# Patient Record
Sex: FEMALE | Race: OTHER | HISPANIC OR LATINO
[De-identification: names, ages, dates, MRNs, and addresses within clinical notes are randomized per-mention and may not be internally consistent; named-entity substitution may affect disease eponyms.]

---

## 2020-03-17 PROBLEM — Z00.00 ENCOUNTER FOR PREVENTIVE HEALTH EXAMINATION: Status: ACTIVE | Noted: 2020-03-17

## 2020-04-02 ENCOUNTER — APPOINTMENT (OUTPATIENT)
Dept: PULMONOLOGY | Facility: CLINIC | Age: 70
End: 2020-04-02

## 2020-07-01 ENCOUNTER — APPOINTMENT (OUTPATIENT)
Dept: PULMONOLOGY | Facility: CLINIC | Age: 70
End: 2020-07-01
Payer: MEDICARE

## 2020-07-01 VITALS
HEIGHT: 67 IN | DIASTOLIC BLOOD PRESSURE: 90 MMHG | SYSTOLIC BLOOD PRESSURE: 120 MMHG | TEMPERATURE: 98 F | WEIGHT: 140 LBS | RESPIRATION RATE: 12 BRPM | HEART RATE: 67 BPM | BODY MASS INDEX: 21.97 KG/M2

## 2020-07-01 DIAGNOSIS — I73.00 RAYNAUD'S SYNDROME W/OUT GANGRENE: ICD-10-CM

## 2020-07-01 PROCEDURE — 99204 OFFICE O/P NEW MOD 45 MIN: CPT

## 2020-07-01 RX ORDER — HYDROXYCHLOROQUINE SULFATE 200 MG/1
200 TABLET, FILM COATED ORAL
Refills: 0 | Status: ACTIVE | COMMUNITY

## 2020-07-01 RX ORDER — AMLODIPINE BESYLATE 5 MG/1
TABLET ORAL
Refills: 0 | Status: ACTIVE | COMMUNITY

## 2020-07-01 NOTE — CONSULT LETTER
[Please see my note below.] : Please see my note below. [Consult Letter:] : I had the pleasure of evaluating your patient, [unfilled]. [Consult Closing:] : Thank you very much for allowing me to participate in the care of this patient.  If you have any questions, please do not hesitate to contact me. [FreeTextEntry2] : TEE Richter [FreeTextEntry3] : Jose Winter MD\par Director, [facility]\par Pulmonary Division\par [facility]\par [address]\par [place] [id number]\par [phone]  [Sincerely,] : Sincerely,

## 2020-07-01 NOTE — ASSESSMENT
[FreeTextEntry1] : ? interstitial lung disease from SLE in patient with known SLE under good control and Raynaud's phenomenon.  If present may be indcation for more aggressive rx of SLE.  Will get pulmonary function testing (required currently to have Covid-19 testing prior to testing), if significantly abnormal  will get CT chest when next seen.

## 2020-07-01 NOTE — PROCEDURE
[FreeTextEntry1] : CXR from 1/29/2020 reviewed: ? mild interstitial changes at bases, normal heart size, no adenopathy

## 2020-07-01 NOTE — PHYSICAL EXAM
[No Acute Distress] : no acute distress [Normal Oropharynx] : normal oropharynx [Normal Appearance] : normal appearance [No Neck Mass] : no neck mass [Normal S1, S2] : normal s1, s2 [Normal Rate/Rhythm] : normal rate/rhythm [No Resp Distress] : no resp distress [No Murmurs] : no murmurs [No Acc Muscle Use] : no acc muscle use [No Abnormalities] : no abnormalities [Normal to Percussion] : normal to percussion [No Clubbing] : no clubbing [Normal Gait] : normal gait [Benign] : benign [No Edema] : no edema [FROM] : FROM [No Cyanosis] : no cyanosis [Oriented x3] : oriented x3 [Normal Color/ Pigmentation] : normal color/ pigmentation [No Focal Deficits] : no focal deficits [TextBox_68] : few fine crackles heard R>L base, no wheeze [Normal Affect] : normal affect [TextBox_105] : unable to get oximetry reading from fingers (nail polish vs. vascular)

## 2020-07-01 NOTE — HISTORY OF PRESENT ILLNESS
[TextBox_4] : Very pleasant 69-year-old female is here for evaluation of shortness of breath on exertion. She reports no problem on level ground but feels out of breath if she has to walk up hills. This been present for 2 or 3 years, not clearly worsening recently. She denies cough, wheezing, sputum production. There is no history of asthma and she has never smoked. She tells me that in January she had an extensive cardiac evaluation and she was told everything was normal. She does not have seasonal allergies or sinus problems.\par \par She has a history of systemic lupus erythematosus diagnosed about 7 years ago. She presented originally with joint pains. Her complaints are minimal now and she has been followed with hydroxychloroquine treatment only. She does have painful extremities within exposed to cold, consistent with Raynaud's phenomenon.\par \par

## 2020-07-01 NOTE — REVIEW OF SYSTEMS
[Recent Wt Loss (___ Lbs)] : ~T no recent weight loss [Arthralgias] : arthralgias [Recent Wt Gain (___ Lbs)] : ~T no recent weight gain [Negative] : Endocrine [TextBox_94] : some chronic arthritic pain  Rknee

## 2020-10-29 ENCOUNTER — APPOINTMENT (OUTPATIENT)
Dept: PULMONOLOGY | Facility: CLINIC | Age: 70
End: 2020-10-29
Payer: MEDICARE

## 2020-10-29 VITALS
HEART RATE: 93 BPM | BODY MASS INDEX: 21.97 KG/M2 | SYSTOLIC BLOOD PRESSURE: 108 MMHG | WEIGHT: 140 LBS | HEIGHT: 67 IN | DIASTOLIC BLOOD PRESSURE: 80 MMHG | TEMPERATURE: 98.3 F | OXYGEN SATURATION: 100 %

## 2020-10-29 DIAGNOSIS — R06.00 DYSPNEA, UNSPECIFIED: ICD-10-CM

## 2020-10-29 PROCEDURE — 99072 ADDL SUPL MATRL&STAF TM PHE: CPT

## 2020-10-29 PROCEDURE — 99214 OFFICE O/P EST MOD 30 MIN: CPT

## 2020-10-29 NOTE — PHYSICAL EXAM
[No Acute Distress] : no acute distress [Normal Appearance] : normal appearance [Normal Rate/Rhythm] : normal rate/rhythm [Normal S1, S2] : normal s1, s2 [No Murmurs] : no murmurs [No Resp Distress] : no resp distress [Clear to Auscultation Bilaterally] : clear to auscultation bilaterally [No Cyanosis] : no cyanosis [No Edema] : no edema [FROM] : FROM [Normal Color/ Pigmentation] : normal color/ pigmentation [Oriented x3] : oriented x3 [Normal Affect] : normal affect

## 2020-11-16 ENCOUNTER — LABORATORY RESULT (OUTPATIENT)
Age: 70
End: 2020-11-16

## 2020-11-18 ENCOUNTER — APPOINTMENT (OUTPATIENT)
Dept: PULMONOLOGY | Facility: CLINIC | Age: 70
End: 2020-11-18
Payer: MEDICARE

## 2020-11-18 PROBLEM — R06.00 DYSPNEA ON EXERTION: Status: ACTIVE | Noted: 2020-07-01

## 2020-11-18 PROCEDURE — 94729 DIFFUSING CAPACITY: CPT

## 2020-11-18 PROCEDURE — 94060 EVALUATION OF WHEEZING: CPT

## 2020-11-18 PROCEDURE — 94727 GAS DIL/WSHOT DETER LNG VOL: CPT

## 2020-11-18 NOTE — HISTORY OF PRESENT ILLNESS
[TextBox_4] : 10/29/2020: Asked to evaluate patient by Dr Sotero Parker for dyspnea; however, I see she was evaluated by Dr Winter for this same problem in July and planned for PFT and, if abnormal, CT chest. She has a background of SLE on HCQ, Dr Moulton. Unclear why the PFT was not done. Reports dyspnea since last year on walking or household activities. Not worsening. Sensation of cough w mucus in throat. No history of lung disease, never smoker. Retired . Has seen Dr Isaac from cardiology, had ECG, echo, stress, reports all normal.\par

## 2020-11-18 NOTE — CONSULT LETTER
[Dear  ___] : Dear  [unfilled], [Courtesy Letter:] : I had the pleasure of seeing your patient, [unfilled], in my office today. [Please see my note below.] : Please see my note below. [Consult Closing:] : Thank you very much for allowing me to participate in the care of this patient.  If you have any questions, please do not hesitate to contact me. [Sincerely,] : Sincerely, [FreeTextEntry2] : Sotero Parker MD\par [address]\par [place] [address]\par  [FreeTextEntry3] : Marycarmen Benitez MD, FCCP\par

## 2020-11-18 NOTE — ASSESSMENT
[FreeTextEntry1] : Data reviewed:\par \par PA/lat CXR LHR 2/2020 personally reviewed : some inc markings at L base\par CT abd LHR 2016 had normal lung parenchyma\par \par Impression:\par PATEL\par SLE\par \par Plan:\par Agree w Dr Winter's plan. Get PFT; if abnormal, HRCT.\par --\par PFT 11/18/20: severe restriction, FVC 1.59L (56%), TLC 2.26L (47%), DLCO 9.32 (45%) / get HRCT

## 2020-12-01 ENCOUNTER — NON-APPOINTMENT (OUTPATIENT)
Age: 70
End: 2020-12-01

## 2020-12-17 ENCOUNTER — APPOINTMENT (OUTPATIENT)
Dept: PULMONOLOGY | Facility: CLINIC | Age: 70
End: 2020-12-17

## 2021-01-15 ENCOUNTER — APPOINTMENT (OUTPATIENT)
Dept: PULMONOLOGY | Facility: CLINIC | Age: 71
End: 2021-01-15
Payer: MEDICARE

## 2021-01-15 VITALS
HEART RATE: 91 BPM | HEIGHT: 67 IN | BODY MASS INDEX: 22.29 KG/M2 | TEMPERATURE: 97.2 F | DIASTOLIC BLOOD PRESSURE: 78 MMHG | WEIGHT: 142 LBS | SYSTOLIC BLOOD PRESSURE: 90 MMHG | OXYGEN SATURATION: 98 %

## 2021-01-15 PROCEDURE — 99072 ADDL SUPL MATRL&STAF TM PHE: CPT

## 2021-01-15 PROCEDURE — 99214 OFFICE O/P EST MOD 30 MIN: CPT

## 2021-01-15 NOTE — CONSULT LETTER
[Dear  ___] : Dear  [unfilled], [Courtesy Letter:] : I had the pleasure of seeing your patient, [unfilled], in my office today. [Please see my note below.] : Please see my note below. [Consult Closing:] : Thank you very much for allowing me to participate in the care of this patient.  If you have any questions, please do not hesitate to contact me. [Sincerely,] : Sincerely, [___] : [unfilled] [FreeTextEntry2] : Misael Moulton MD\par [address] [place] [address]\[place] [address] [FreeTextEntry3] : Marycarmen Benitez MD, FCCP\par

## 2021-01-15 NOTE — PHYSICAL EXAM
[No Acute Distress] : no acute distress [Normal Rate/Rhythm] : normal rate/rhythm [Normal S1, S2] : normal s1, s2 [No Resp Distress] : no resp distress [No Murmurs] : no murmurs [No Cyanosis] : no cyanosis [No Edema] : no edema [FROM] : FROM [TextBox_68] : alfredo gómez

## 2021-01-15 NOTE — ASSESSMENT
[FreeTextEntry1] : Data reviewed:\par \par CT chest LHR 12/8/2020 personally reviewed :\par 1. Bilateral apical pleural parenchymal scar and mild cylindrical traction bronchiectasis\par 2. Predominantly subpleural pattern of interstitial and fibrotic lung disease, mild traction bronchiectasis, predominantly in the mid to lower lung fields. \par 3. Scattered mild pattern of groundglass opacity, multifocal, predominantly mid to lower lung fields, not sparing the immediate subpleural lung fields. \par 4. Limited liver images demonstrate moderate fatty infiltration.\par 5. At least mild sliding hiatal hernia.\par CT abd LHR 2016 had normal lung parenchyma\par \par PFT 11/18/20: severe restriction, FVC 1.59L (56%), TLC 2.26L (47%), DLCO 9.32 (45%)\par \par Impression:\par SLE-ILD\par \par Plan:\par May be NSIP. Would give trial of antiinflammatory, prednisone 60mg x 1 month, w Bactrim ppx.\par Will see her back in one month and hopefully taper.\par If there is evidence of progression over time would be a candidate for Ofev, but I only have limited data from 5 years ago and then the current time point, so will observe.

## 2021-01-15 NOTE — HISTORY OF PRESENT ILLNESS
[TextBox_4] : 10/29/2020: Asked to evaluate patient by Dr Sotero Parker for dyspnea; however, I see she was evaluated by Dr Winter for this same problem in July and planned for PFT and, if abnormal, CT chest. She has a background of SLE on HCQ, Dr Moulton. Unclear why the PFT was not done. Reports dyspnea since last year on walking or household activities. Not worsening. Sensation of cough w mucus in throat. No history of lung disease, never smoker. Retired . Has seen Dr Isaac from cardiology, had ECG, echo, stress, reports all normal.\par \par 1/15/21: Returned for follow up. No change in her dyspnea. Has had phone follow up w Dr Moulton, remains on HCQ.\par

## 2021-02-10 ENCOUNTER — APPOINTMENT (OUTPATIENT)
Dept: ENDOCRINOLOGY | Facility: CLINIC | Age: 71
End: 2021-02-10
Payer: MEDICARE

## 2021-02-10 VITALS
BODY MASS INDEX: 22.29 KG/M2 | HEART RATE: 108 BPM | DIASTOLIC BLOOD PRESSURE: 82 MMHG | HEIGHT: 67 IN | WEIGHT: 142 LBS | SYSTOLIC BLOOD PRESSURE: 137 MMHG

## 2021-02-10 DIAGNOSIS — E55.9 VITAMIN D DEFICIENCY, UNSPECIFIED: ICD-10-CM

## 2021-02-10 PROCEDURE — 99205 OFFICE O/P NEW HI 60 MIN: CPT

## 2021-02-10 PROCEDURE — 99072 ADDL SUPL MATRL&STAF TM PHE: CPT

## 2021-02-12 PROBLEM — E55.9 HYPOVITAMINOSIS D: Status: ACTIVE | Noted: 2021-02-12

## 2021-02-12 NOTE — HISTORY OF PRESENT ILLNESS
[FreeTextEntry1] : 69 y/o F w/ Hx of OP, SLE on HCQ, Raynaud's, prior chronic steroid use and possible ILD.\par here for initial evaluation and management of bone health\par generally feels well and endorses no acute complaints.\par reports being told by Rheumatologist that "her hormones were low". States her initial diagnosis of OP was ~ 4-5 years ago, completed ~ 3 years of Boniva w. fair adherence up until 2018. Is not sure why medication was stopped. Also reports taking ~ 1 year of methylprednisolone for rheumatologic issues, was gradually titrated off in early 2019. she was off steroids up until 1/2021 when she was started on prednisone for potential ILD by Pulm. She is adherent to this and reports she was instructed to stop w titration. States for her OP, she received a single dose of Prolia in 2020, but was told afterwards that since her "hormones were off, she should see and Endocrinologist". she denies any history of personal or maternal fractures, nephrolithiasis. denies frequent falls. She otherwise denies any f/c, CP, SOB, palpitations, tremors, depressed mood, anxiety, palpitations, n/v, stool/urinary abn, skin/weight changes, heat/cold intolerance, HAs, breast/nipple changes, polyuria/polydipsia/nocturia or other complaints.\par she again denies any dysphagia, hoarseness, neck tenderness or new palpable masses. she again denies any family history of thyroid disorders or personal exposure to ionizing radiation.\par \par

## 2021-02-12 NOTE — ASSESSMENT
[Denosumab Therapy] : Risks  and benefits of denosumab therapy were discussed with the patient including eczema, cellulitis, osteonecrosis of the jaw and atypical femur fractures [Bisphosphonate Therapy] : Risks and benefits of bisphosphonate therapy were  discussed with the patient including gastroesophageal irritation, osteonecrosis of the jaw, and atypical femur fractures, and acute phase reaction [Teriparatide/Abaloparatide Therapy] : Risks and benefits of Teriparatide/Abaloparatide therapy were discussed with the patient including potential risk of osteosarcoma [FreeTextEntry1] : - Bone health:\par No collateral info provided by referring physician, no DEXA available for review, she would classify as osteoporotic from a risk factor standpoint, and would likely benefit from antiresorptive therapy. Risks and benefits including ONJ, AF and GERD discussed at length. She verbalized understanding and she would like to continue vitamin d and dietary calcium for 2ry prevention of fractures. check labs to r/o secondary causes of OP. Referred to physical therapy for LE strengthening exercises and mobility assessment. obtain collateral info from referring physician, including DEXA history.\par

## 2021-02-12 NOTE — PHYSICAL EXAM
[Alert] : alert [No Acute Distress] : no acute distress [Well Nourished] : well nourished [Well Developed] : well developed [Normal Sclera/Conjunctiva] : normal sclera/conjunctiva [EOMI] : extra ocular movement intact [No Proptosis] : no proptosis [Normal Oropharynx] : the oropharynx was normal [Thyroid Not Enlarged] : the thyroid was not enlarged [No Thyroid Nodules] : no palpable thyroid nodules [No Respiratory Distress] : no respiratory distress [No Accessory Muscle Use] : no accessory muscle use [Clear to Auscultation] : lungs were clear to auscultation bilaterally [Normal S1, S2] : normal S1 and S2 [Normal Rate] : heart rate was normal [Regular Rhythm] : with a regular rhythm [No Edema] : no peripheral edema [Pedal Pulses Normal] : the pedal pulses are present [Normal Bowel Sounds] : normal bowel sounds [Not Tender] : non-tender [Not Distended] : not distended [Soft] : abdomen soft [Normal Anterior Cervical Nodes] : no anterior cervical lymphadenopathy [Normal Posterior Cervical Nodes] : no posterior cervical lymphadenopathy [No Spinal Tenderness] : no spinal tenderness [Spine Straight] : spine straight [No Stigmata of Cushings Syndrome] : no stigmata of Cushings Syndrome [Normal Gait] : normal gait [Normal Strength/Tone] : muscle strength and tone were normal [No Rash] : no rash [Acanthosis Nigricans] : no acanthosis nigricans [Normal Reflexes] : deep tendon reflexes were 2+ and symmetric [No Tremors] : no tremors [Oriented x3] : oriented to person, place, and time

## 2021-02-22 ENCOUNTER — LABORATORY RESULT (OUTPATIENT)
Age: 71
End: 2021-02-22

## 2021-02-24 ENCOUNTER — APPOINTMENT (OUTPATIENT)
Dept: PULMONOLOGY | Facility: CLINIC | Age: 71
End: 2021-02-24
Payer: MEDICARE

## 2021-02-24 VITALS
BODY MASS INDEX: 27.88 KG/M2 | OXYGEN SATURATION: 97 % | WEIGHT: 142 LBS | SYSTOLIC BLOOD PRESSURE: 120 MMHG | DIASTOLIC BLOOD PRESSURE: 72 MMHG | HEART RATE: 107 BPM | TEMPERATURE: 97.7 F | HEIGHT: 60 IN

## 2021-02-24 DIAGNOSIS — Z79.52 LONG TERM (CURRENT) USE OF SYSTEMIC STEROIDS: ICD-10-CM

## 2021-02-24 PROCEDURE — 94060 EVALUATION OF WHEEZING: CPT

## 2021-02-24 PROCEDURE — 94727 GAS DIL/WSHOT DETER LNG VOL: CPT

## 2021-02-24 PROCEDURE — 94729 DIFFUSING CAPACITY: CPT

## 2021-02-24 PROCEDURE — 99214 OFFICE O/P EST MOD 30 MIN: CPT | Mod: 25,GC

## 2021-02-24 PROCEDURE — 99072 ADDL SUPL MATRL&STAF TM PHE: CPT

## 2021-02-24 NOTE — REVIEW OF SYSTEMS
[Fatigue] : fatigue [Dry Mouth] : dry mouth [Abdominal Pain] : abdominal pain [Raynaud] : raynaud [Dizziness] : dizziness [Tremor] : tremor [Fever] : no fever [Chills] : no chills [Poor Appetite] : no poor appetite [Eye Irritation] : no eye irritation [Sinus Problems] : no sinus problems [Mouth Ulcers] : no mouth ulcers [Cough] : no cough [Sputum] : no sputum [Dyspnea] : no dyspnea [Pleuritic Pain] : no pleuritic pain [Wheezing] : no wheezing [SOB on Exertion] : no sob on exertion [Chest Discomfort] : no chest discomfort [Orthopnea] : no orthopnea [Palpitations] : no palpitations [Nasal Discharge] : no nasal discharge [GERD] : no gerd [Nausea] : no nausea [Vomiting] : no vomiting [Diarrhea] : no diarrhea [Constipation] : no constipation [Dysphagia] : no dysphagia [Nocturia] : no nocturia [Dysuria] : no dysuria [Arthralgias] : no arthralgias [Myalgias] : no myalgias [Back Pain] : no back pain [Ulcerations] : no ulcerations [Anemia] : no anemia [Headache] : no headache [Focal Weakness] : no focal weakness [Depression] : no depression [Anxiety] : no anxiety [Diabetes] : no diabetes

## 2021-02-24 NOTE — PHYSICAL EXAM
[No Acute Distress] : no acute distress [Normal Oropharynx] : normal oropharynx [I] : Mallampati Class: I [Normal Appearance] : normal appearance [No Neck Mass] : no neck mass [Normal Rate/Rhythm] : normal rate/rhythm [Normal S1, S2] : normal s1, s2 [No Murmurs] : no murmurs [No Resp Distress] : no resp distress [No Abnormalities] : no abnormalities [Benign] : benign [Normal Gait] : normal gait [No Clubbing] : no clubbing [No Cyanosis] : no cyanosis [No Edema] : no edema [FROM] : FROM [Normal Color/ Pigmentation] : normal color/ pigmentation [No Focal Deficits] : no focal deficits [Oriented x3] : oriented x3 [Normal Affect] : normal affect [TextBox_68] : fine inspiratory crackles in bases.

## 2021-02-24 NOTE — ASSESSMENT
[FreeTextEntry1] : Data reviewed:\par \par CT chest LHR 12/8/2020 personally reviewed :\par 1. Bilateral apical pleural parenchymal scar and mild cylindrical traction bronchiectasis\par 2. Predominantly subpleural pattern of interstitial and fibrotic lung disease, mild traction bronchiectasis, predominantly in the mid to lower lung fields. \par 3. Scattered mild pattern of groundglass opacity, multifocal, predominantly mid to lower lung fields, not sparing the immediate subpleural lung fields. \par 4. Limited liver images demonstrate moderate fatty infiltration.\par 5. At least mild sliding hiatal hernia.\par CT abd LHR 2016 had normal lung parenchyma\par \par PFT 11/18/20: severe restriction, FVC 1.59L (56%), TLC 2.26L (47%), DLCO 9.32 (45%)\par PFT 2/24/21: FVC 1.81L (64%), FEV1 1.56L (71%), TLC 2.49L (51%), DLCO 10.82 (52%)\par \par Impression:\par SLE-ILD\par \par Plan:\par May be NSIP. On prednisone 60mg /day currently. Given improvement in symptoms would taper the dose, 40 mg /day for next 2 weeks and then 30 mg /day x  2 weeks. deceasing the dose would improve some of the side effects. PFT shows some improvement in TLC and DLCO. \par DIscussed the importance of being on Bactrim ppx and PPI. \par Would repeat CT chest in 06/2021. \par High risk for complications from steroids, Son probably is safe to see her as its been > 1 month but should get tested before seeing her ideally. \par She would see if she can get an appointment for COVID VACCINE. \par --\par Attending Addendum\par \par Seen and examined by me and agree w above. Dyspnea markedly improved on prednisone but w sig side effects at this fairly high dose. Will decrease to 40mg x 2 wks, 30mg x 2 wks and see her back. This should be more tolerable. Cont Bactrim ppx and PPI. Goal will be to taper off steroids if possible, without recurrence of sx.

## 2021-02-24 NOTE — HISTORY OF PRESENT ILLNESS
[TextBox_4] : 10/29/2020: Asked to evaluate patient by Dr Sotero Parker for dyspnea; however, I see she was evaluated by Dr Winter for this same problem in July and planned for PFT and, if abnormal, CT chest. She has a background of SLE on HCQ, Dr Moulton. Unclear why the PFT was not done. Reports dyspnea since last year on walking or household activities. Not worsening. Sensation of cough w mucus in throat. No history of lung disease, never smoker. Retired . Has seen Dr Isaac from cardiology, had ECG, echo, stress, reports all normal.\par \par 1/15/21: Returned for follow up. No change in her dyspnea. Has had phone follow up w Dr Moulton, remains on HCQ. Recommended trial of steroids for NSIP.\par \par 2/24/21 [Andres]: Here for follow up. PATEL improved after 2 weeks of being on prednisone 60 mg /day. C/o jitteriness, shakes, insomnia , face swelling, weakness and burning sensation in stomach. Taking beactrim but not PPI. wants to know if her son can meet her who was COVID positive about 1 month ago. Has not taken covid vaccine yet. \par

## 2021-02-24 NOTE — CONSULT LETTER
[Dear  ___] : Dear  [unfilled], [Courtesy Letter:] : I had the pleasure of seeing your patient, [unfilled], in my office today. [Please see my note below.] : Please see my note below. [Consult Closing:] : Thank you very much for allowing me to participate in the care of this patient.  If you have any questions, please do not hesitate to contact me. [Sincerely,] : Sincerely, [___] : [unfilled] [FreeTextEntry2] : Misael Moulton MD\par [address] [place] [address]\[place] [address] [FreeTextEntry3] : Marycarmen Benitez MD, FCCP\par

## 2021-04-07 ENCOUNTER — APPOINTMENT (OUTPATIENT)
Dept: ENDOCRINOLOGY | Facility: CLINIC | Age: 71
End: 2021-04-07
Payer: MEDICARE

## 2021-04-07 VITALS
WEIGHT: 148 LBS | HEART RATE: 103 BPM | BODY MASS INDEX: 23.23 KG/M2 | SYSTOLIC BLOOD PRESSURE: 124 MMHG | HEIGHT: 67 IN | DIASTOLIC BLOOD PRESSURE: 76 MMHG

## 2021-04-07 DIAGNOSIS — R68.89 OTHER GENERAL SYMPTOMS AND SIGNS: ICD-10-CM

## 2021-04-07 DIAGNOSIS — M81.0 AGE-RELATED OSTEOPOROSIS W/OUT CURRENT PATHOLOGICAL FRACTURE: ICD-10-CM

## 2021-04-07 PROCEDURE — 99215 OFFICE O/P EST HI 40 MIN: CPT

## 2021-04-07 PROCEDURE — 99072 ADDL SUPL MATRL&STAF TM PHE: CPT

## 2021-04-09 PROBLEM — M81.0 AGE-RELATED OSTEOPOROSIS WITHOUT CURRENT PATHOLOGICAL FRACTURE: Status: ACTIVE | Noted: 2021-02-10

## 2021-04-09 PROBLEM — R68.89 CUSHINGOID FACIES: Status: ACTIVE | Noted: 2021-04-09

## 2021-04-09 NOTE — ASSESSMENT
[Denosumab Therapy] : Risks  and benefits of denosumab therapy were discussed with the patient including eczema, cellulitis, osteonecrosis of the jaw and atypical femur fractures [Bisphosphonate Therapy] : Risks and benefits of bisphosphonate therapy were  discussed with the patient including gastroesophageal irritation, osteonecrosis of the jaw, and atypical femur fractures, and acute phase reaction [Teriparatide/Abaloparatide Therapy] : Risks and benefits of Teriparatide/Abaloparatide therapy were discussed with the patient including potential risk of osteosarcoma [FreeTextEntry1] : - Bone health:\par DEXA available for review, osteoporotic, T score at L femoral neck at -2.7, LS -2.4, Hip at -3, she would classify as osteoporotic from a risk factor standpoint, and would likely benefit from ocntinued antiresorptive therapy. Risks and benefits including ONJ, AF and GERD discussed at length. She verbalized understanding and she would like to continue vitamin d and dietary calcium for 2ry prevention of fractures. labsnot suggestive of secondary causes of OP. Referred to physical therapy for LE strengthening exercises and mobility assessment. \par \par 2ry hyperparathyroidism\par likely related to stable stage III CKD, advised that she may continue Prolia injections and to discuss w/ Rheum regarding continuation. Advised to avoid prolongued interruption of Prolia\par \par Cushingoid appearance\par Advised she will require prolongued steroid taper, and to not stop prednisone abruptly, reviewed s/s of AI, advised to discuss steroid sparing strategies w/ pulm to avoid steroid related s/e. reviewed steroid emergency dosing instructions. Verbalized understanding and agrees with treatment plan, will contact MD and seek emergency medical care if condition changes.\par \par

## 2021-04-09 NOTE — HISTORY OF PRESENT ILLNESS
[FreeTextEntry1] : 71 y/o F w/ Hx of OP, SLE on HCQ, Raynaud's, prior chronic steroid use and possible ILD.\par initial evaluation and management of bone health\par generally feels well and endorses no acute complaints.\par reports being told by Rheumatologist that "her hormones were low". States her initial diagnosis of OP was ~ 4-5 years ago, completed ~ 3 years of Boniva w. fair adherence up until 2018. Is not sure why medication was stopped. Also reports taking ~ 1 year of methylprednisolone for rheumatologic issues, was gradually titrated off in early 2019. she was off steroids up until 1/2021 when she was started on prednisone for potential ILD by Pulm. She is adherent to this and reports she was instructed to stop w titration. States for her OP, she received a single dose of Prolia in 2020, but was told afterwards that since her "hormones were off, she should see and Endocrinologist". she denies any history of personal or maternal fractures, nephrolithiasis. denies frequent falls. \par \par 4/2021: Here for /fu, generally feels well and endorses no acute complaints. No interval events since LV. Today comes for lab f/u, which shows mildly elevated PTH ~80 w/ normocalcemia. Vitamin D replete. CKD stable stage III.\par She otherwise denies any f/c, CP, SOB, palpitations, tremors, depressed mood, anxiety, palpitations, n/v, stool/urinary abn, skin/weight changes, heat/cold intolerance, HAs, breast/nipple changes, polyuria/polydipsia/nocturia or other complaints.\par she again denies any dysphagia, hoarseness, neck tenderness or new palpable masses. she again denies any family history of thyroid disorders or personal exposure to ionizing radiation.\par \par

## 2021-04-15 ENCOUNTER — APPOINTMENT (OUTPATIENT)
Dept: PULMONOLOGY | Facility: CLINIC | Age: 71
End: 2021-04-15
Payer: MEDICARE

## 2021-04-15 VITALS
DIASTOLIC BLOOD PRESSURE: 76 MMHG | HEIGHT: 67 IN | OXYGEN SATURATION: 91 % | SYSTOLIC BLOOD PRESSURE: 100 MMHG | TEMPERATURE: 96.8 F | HEART RATE: 86 BPM | WEIGHT: 148 LBS | BODY MASS INDEX: 23.23 KG/M2

## 2021-04-15 PROCEDURE — 99213 OFFICE O/P EST LOW 20 MIN: CPT | Mod: 25

## 2021-04-15 PROCEDURE — 99072 ADDL SUPL MATRL&STAF TM PHE: CPT

## 2021-04-15 PROCEDURE — 71046 X-RAY EXAM CHEST 2 VIEWS: CPT

## 2021-04-15 NOTE — CONSULT LETTER
[Dear  ___] : Dear  [unfilled], [Courtesy Letter:] : I had the pleasure of seeing your patient, [unfilled], in my office today. [Please see my note below.] : Please see my note below. [Consult Closing:] : Thank you very much for allowing me to participate in the care of this patient.  If you have any questions, please do not hesitate to contact me. [Sincerely,] : Sincerely, [FreeTextEntry2] : Misael Moulton MD\par [address] [place] [address]\[place] [address] [FreeTextEntry3] : Marycarmen Benitez MD, FCCP\par  [___] : [unfilled]

## 2021-04-15 NOTE — HISTORY OF PRESENT ILLNESS
[TextBox_4] : 10/29/2020: Asked to evaluate patient by Dr Sotero Parker for dyspnea; however, I see she was evaluated by Dr Winter for this same problem in July and planned for PFT and, if abnormal, CT chest. She has a background of SLE on HCQ, Dr Moulton. Unclear why the PFT was not done. Reports dyspnea since last year on walking or household activities. Not worsening. Sensation of cough w mucus in throat. No history of lung disease, never smoker. Retired . Has seen Dr Isaac from cardiology, had ECG, echo, stress, reports all normal.\par \par 1/15/21: Returned for follow up. No change in her dyspnea. Has had phone follow up w Dr Moulton, remains on HCQ. Recommended trial of steroids for NSIP.\par \par 2/24/21 [Andres]: Here for follow up. PATEL improved after 2 weeks of being on prednisone 60 mg /day. C/o jitteriness, shakes, insomnia , face swelling, weakness and burning sensation in stomach. Taking bactrim but not PPI. wants to know if her son can meet her who was COVID positive about 1 month ago. Has not taken covid vaccine yet. Tapered prednisone to 40mg --> 30mg.\par \par 4/15/21: Has facial puffiness, gained weight. Still having side effects from the prednisone. Feeling overall better as the dose goes down; no worsening of dyspnea as the dose reduced. Had both doses of Pfizer. Son is in [place]. He has recovered from Covid. On Bactrim tiwk and omeprazole.

## 2021-04-15 NOTE — PHYSICAL EXAM
[Normal Rate/Rhythm] : normal rate/rhythm [Normal S1, S2] : normal s1, s2 [No Resp Distress] : no resp distress [No Murmurs] : no murmurs [No Clubbing] : no clubbing [No Edema] : no edema [TextBox_68] : fine crackles at bases

## 2021-04-15 NOTE — ASSESSMENT
[FreeTextEntry1] : Data reviewed:\par \par CT chest [facility] 12/8/2020 :\par 1. Bilateral apical pleural parenchymal scar and mild cylindrical traction bronchiectasis\par 2. Predominantly subpleural pattern of interstitial and fibrotic lung disease, mild traction bronchiectasis, predominantly in the mid to lower lung fields. \par 3. Scattered mild pattern of groundglass opacity, multifocal, predominantly mid to lower lung fields, not sparing the immediate subpleural lung fields. \par 4. Limited liver images demonstrate moderate fatty infiltration.\par 5. At least mild sliding hiatal hernia.\par PA/lat CXR in office 4/15/21: still some L basilar infiltrate but no worse than 2/2020 [facility]\par \par PFT 11/18/20: FVC 1.59L (56%), TLC 2.26L (47%), DLCO 9.32 (45%)\par PFT 2/24/21: FVC 1.81L (64%), FEV1 1.56L (71%), TLC 2.49L (51%), DLCO 10.82 (52%)\par \par Impression:\par SLE-ILD\par \par Plan:\par Continue tapering prednisone to 20mg for rest of month, then 10mg and return mid-April.\par Cont Bactrim ppx and PPI. \par Plan to repeat CT chest in 06/2021. \par Had Covid vaccine.

## 2021-05-25 ENCOUNTER — APPOINTMENT (OUTPATIENT)
Dept: PULMONOLOGY | Facility: CLINIC | Age: 71
End: 2021-05-25
Payer: MEDICARE

## 2021-05-25 VITALS
WEIGHT: 156 LBS | BODY MASS INDEX: 25.99 KG/M2 | HEIGHT: 65 IN | TEMPERATURE: 97.3 F | DIASTOLIC BLOOD PRESSURE: 78 MMHG | HEART RATE: 83 BPM | OXYGEN SATURATION: 100 % | SYSTOLIC BLOOD PRESSURE: 122 MMHG

## 2021-05-25 DIAGNOSIS — Z23 ENCOUNTER FOR IMMUNIZATION: ICD-10-CM

## 2021-05-25 PROCEDURE — 99214 OFFICE O/P EST MOD 30 MIN: CPT

## 2021-05-25 NOTE — PHYSICAL EXAM
[Normal Rate/Rhythm] : normal rate/rhythm [Normal S1, S2] : normal s1, s2 [No Murmurs] : no murmurs [No Resp Distress] : no resp distress [No Clubbing] : no clubbing [No Edema] : no edema [TextBox_68] : fine crackles at bases

## 2021-05-25 NOTE — ASSESSMENT
[FreeTextEntry1] : Data reviewed:\par \par CT chest [facility] 12/8/2020 personally reviewed :\par 1. Bilateral apical pleural parenchymal scar and mild cylindrical traction bronchiectasis\par 2. Predominantly subpleural pattern of interstitial and fibrotic lung disease, mild traction bronchiectasis, predominantly in the mid to lower lung fields. \par 3. Scattered mild pattern of groundglass opacity, multifocal, predominantly mid to lower lung fields, not sparing the immediate subpleural lung fields. \par 4. Limited liver images demonstrate moderate fatty infiltration.\par 5. At least mild sliding hiatal hernia.\par \par PA/lat CXR in office 4/15/21: still some L basilar infiltrate but no worse than 2/2020 [facility]\par \par PFT 11/18/20: FVC 1.59L (56%), TLC 2.26L (47%), DLCO 9.32 (45%)\par PFT 2/24/21: FVC 1.81L (64%), FEV1 1.56L (71%), TLC 2.49L (51%), DLCO 10.82 (52%)\par \par Impression:\par SLE-ILD\par \par Plan:\par Has done very well with prednisone. Cont tapering, go down to 5mg now.\par Cont Bactrim ppx and PPI. \par Will repeat CT chest and PFT now.\par Question will be whether we should taper her to nothing and observe. or does she need some ongoing immunosuppression.

## 2021-05-25 NOTE — CONSULT LETTER
[Dear  ___] : Dear  [unfilled], [Courtesy Letter:] : I had the pleasure of seeing your patient, [unfilled], in my office today. [Please see my note below.] : Please see my note below. [Consult Closing:] : Thank you very much for allowing me to participate in the care of this patient.  If you have any questions, please do not hesitate to contact me. [Sincerely,] : Sincerely, [FreeTextEntry2] : Misael Moulton MD\par [address] [place] [address]\[place] [address] [FreeTextEntry3] : Marycarmen Benitez MD, FCCP\par  [___] : [unfilled]

## 2021-05-25 NOTE — HISTORY OF PRESENT ILLNESS
[TextBox_4] : 10/29/2020: Asked to evaluate patient by Dr Sotero Parker for dyspnea; however, I see she was evaluated by Dr Winter for this same problem in July and planned for PFT and, if abnormal, CT chest. She has a background of SLE on HCQ, Dr Moulton. Unclear why the PFT was not done. Reports dyspnea since last year on walking or household activities. Not worsening. Sensation of cough w mucus in throat. No history of lung disease, never smoker. Retired . Has seen Dr Isaac from cardiology, had ECG, echo, stress, reports all normal.\par \par 1/15/21: Returned for follow up. No change in her dyspnea. Has had phone follow up w Dr Moulton, remains on HCQ. Recommended trial of steroids for NSIP.\par \par 2/24/21 [Andres]: Here for follow up. PATEL improved after 2 weeks of being on prednisone 60 mg /day. C/o jitteriness, shakes, insomnia , face swelling, weakness and burning sensation in stomach. Taking bactrim but not PPI. wants to know if her son can meet her who was COVID positive about 1 month ago. Has not taken covid vaccine yet. Tapered prednisone to 40mg --> 30mg.\par \par 4/15/21: Has facial puffiness, gained weight. Still having side effects from the prednisone. Feeling overall better as the dose goes down; no worsening of dyspnea as the dose reduced. Had both doses of Pfizer. Son is in [place]. He has recovered from Covid. On Bactrim tiwk and omeprazole. Plan taper to 20mg rest of April, then 10mg in May.\par \par 5/25/21: She is doing very well. Her breathing is nearly back to normal. She's had no worsening as she tapered prednisone to 20mg and then to 10mg. She's gained 10-12 lbs total because of prednisone.

## 2021-06-24 ENCOUNTER — APPOINTMENT (OUTPATIENT)
Dept: PULMONOLOGY | Facility: CLINIC | Age: 71
End: 2021-06-24
Payer: MEDICARE

## 2021-06-24 VITALS
DIASTOLIC BLOOD PRESSURE: 69 MMHG | HEIGHT: 65 IN | SYSTOLIC BLOOD PRESSURE: 110 MMHG | TEMPERATURE: 97.7 F | OXYGEN SATURATION: 98 % | BODY MASS INDEX: 25.99 KG/M2 | WEIGHT: 156 LBS | HEART RATE: 87 BPM

## 2021-06-24 PROCEDURE — 99214 OFFICE O/P EST MOD 30 MIN: CPT

## 2021-06-24 NOTE — HISTORY OF PRESENT ILLNESS
[TextBox_4] : 10/29/2020: Asked to evaluate patient by Dr Sotero Parker for dyspnea; however, I see she was evaluated by Dr Winter for this same problem in July and planned for PFT and, if abnormal, CT chest. She has a background of SLE on HCQ, Dr Moulton. Unclear why the PFT was not done. Reports dyspnea since last year on walking or household activities. Not worsening. Sensation of cough w mucus in throat. No history of lung disease, never smoker. Retired . Has seen Dr Isaac from cardiology, had ECG, echo, stress, reports all normal.\par \par 1/15/21: Returned for follow up. No change in her dyspnea. Has had phone follow up w Dr Moulton, remains on HCQ. Recommended trial of steroids for NSIP.\par \par 2/24/21 [Andres]: Here for follow up. PATEL improved after 2 weeks of being on prednisone 60 mg /day. C/o jitteriness, shakes, insomnia , face swelling, weakness and burning sensation in stomach. Taking bactrim but not PPI. wants to know if her son can meet her who was COVID positive about 1 month ago. Has not taken covid vaccine yet. Tapered prednisone to 40mg --> 30mg.\par \par 4/15/21: Has facial puffiness, gained weight. Still having side effects from the prednisone. Feeling overall better as the dose goes down; no worsening of dyspnea as the dose reduced. Had both doses of Pfizer. Son is in [place]. He has recovered from Covid. On Bactrim tiwk and omeprazole. Plan taper to 20mg rest of April, then 10mg in May.\par \par 5/25/21: She is doing very well. Her breathing is nearly back to normal. She's had no worsening as she tapered prednisone to 20mg and then to 10mg. She's gained 10-12 lbs total because of prednisone.\par \par 6/24/21: No problems since seeing me. Down to 5mg w no worsening of symptoms. Weight pretty stable over last month or slightly down. No fevers, no chills, no chest pain. Saw Dr Moulton 2 weeks ago and calcium added to regimen and getting Prolia. Still on HCQ.

## 2021-06-24 NOTE — CONSULT LETTER
[Dear  ___] : Dear  [unfilled], [Courtesy Letter:] : I had the pleasure of seeing your patient, [unfilled], in my office today. [Please see my note below.] : Please see my note below. [Consult Closing:] : Thank you very much for allowing me to participate in the care of this patient.  If you have any questions, please do not hesitate to contact me. [Sincerely,] : Sincerely, [FreeTextEntry2] : Misael Moulton MD\par [address] [place] [address]\[place] [address] [FreeTextEntry3] : Marycarmen Benitez MD, FCCP\par  [___] : [unfilled]

## 2021-06-24 NOTE — ASSESSMENT
[FreeTextEntry1] : Data reviewed:\par \par CT chest [facility] 6/2021 personally reviewed : no sig change from December 2020 in peripheral reticulations, mild traction, some ground glass\par \par PA/lat CXR in office 4/15/21: still some L basilar infiltrate but no worse than 2/2020 [facility]\par \par PFT 11/18/20: FVC 1.59L (56%), TLC 2.26L (47%), DLCO 9.32 (45%)\par PFT 2/24/21: FVC 1.81L (64%), FEV1 1.56L (71%), TLC 2.49L (51%), DLCO 10.82 (52%)\par Francis 6/24/21: FVC 1.69L (56%), FEV1 1.39L (60%)\par \par Impression:\par SLE-ILD\par \par Plan:\par Has done very well in terms of symptom relief w prednisone taper over 6 mos. Her PFT improved a little, and her CT is stable but not improved. Talked to Dr Moulton on phone during visit w pt and will initiate MMF at 500mg bid. Pt to check labs locally in a week and ángel talk to her. Meanwhile taper prednisone to 2.5mg daily x 2 weeks, then can stop prednisone and Bactrim. She will return for her PFT, which had to be rescheduled due to tech calling out today, and then I will also bring her back w repeat PFT in 3-4 mos. Would generally plan to get CT again 6/2022 if she is clinically stable.

## 2021-07-07 ENCOUNTER — APPOINTMENT (OUTPATIENT)
Dept: PULMONOLOGY | Facility: CLINIC | Age: 71
End: 2021-07-07
Payer: MEDICARE

## 2021-07-07 VITALS
BODY MASS INDEX: 26.82 KG/M2 | HEIGHT: 65 IN | HEART RATE: 91 BPM | OXYGEN SATURATION: 98 % | WEIGHT: 161 LBS | DIASTOLIC BLOOD PRESSURE: 72 MMHG | SYSTOLIC BLOOD PRESSURE: 126 MMHG | TEMPERATURE: 97.9 F

## 2021-07-07 PROCEDURE — 94060 EVALUATION OF WHEEZING: CPT

## 2021-07-07 PROCEDURE — 99213 OFFICE O/P EST LOW 20 MIN: CPT | Mod: 25

## 2021-07-07 PROCEDURE — 94727 GAS DIL/WSHOT DETER LNG VOL: CPT

## 2021-07-07 PROCEDURE — 94729 DIFFUSING CAPACITY: CPT

## 2021-07-07 NOTE — ASSESSMENT
[FreeTextEntry1] : Data reviewed:\par \par CT chest [facility] 6/2021 : no sig change from December 2020 in peripheral reticulations, mild traction, some ground glass\par \par PFT 11/18/20: FVC 1.59L (56%), TLC 2.26L (47%), DLCO 9.32 (45%)\par PFT 2/24/21: FVC 1.81L (64%), FEV1 1.56L (71%), TLC 2.49L (51%), DLCO 10.82 (52%)\par PFT 7/7/21: FVC 1.55L (55%), FEV1 1.39L (64%), TLC 2.31L (48%), DLCO 8.87 (43%)\par \par Impression:\par SLE-ILD\par \par Plan:\par Has done well w pred taper, now starting MMF for maintenance.\par Has tolerated MMF x 2 weeks.\par Will track down the labs ([facility]) and needs weekly CBC for first month.\par Seeing her end July, can titrate MMF up if tolerating and labs ok.\par Repeat PFT 3 mos.\par Would generally plan to get CT again 6/2022 if she is clinically stable.

## 2021-07-07 NOTE — CONSULT LETTER
[Dear  ___] : Dear  [unfilled], [Courtesy Letter:] : I had the pleasure of seeing your patient, [unfilled], in my office today. [Please see my note below.] : Please see my note below. [Consult Closing:] : Thank you very much for allowing me to participate in the care of this patient.  If you have any questions, please do not hesitate to contact me. [Sincerely,] : Sincerely, [FreeTextEntry2] : Misael Moulton MD\par [address] [place] [address]\[place] [address] [FreeTextEntry3] : Marycarmen Benitez MD, FCCP\par  [___] : [unfilled]

## 2021-07-07 NOTE — HISTORY OF PRESENT ILLNESS
[TextBox_4] : 10/29/2020: Asked to evaluate patient by Dr Sotero Parker for dyspnea; however, I see she was evaluated by Dr Winter for this same problem in July and planned for PFT and, if abnormal, CT chest. She has a background of SLE on HCQ, Dr Moulton. Unclear why the PFT was not done. Reports dyspnea since last year on walking or household activities. Not worsening. Sensation of cough w mucus in throat. No history of lung disease, never smoker. Retired . Has seen Dr Isaac from cardiology, had ECG, echo, stress, reports all normal.\par \par 1/15/21: Returned for follow up. No change in her dyspnea. Has had phone follow up w Dr Moulton, remains on HCQ. Recommended trial of steroids for NSIP.\par \par 2/24/21 [Andres]: Here for follow up. PATEL improved after 2 weeks of being on prednisone 60 mg /day. C/o jitteriness, shakes, insomnia , face swelling, weakness and burning sensation in stomach. Taking bactrim but not PPI. wants to know if her son can meet her who was COVID positive about 1 month ago. Has not taken covid vaccine yet. Tapered prednisone to 40mg --> 30mg.\par \par 4/15/21: Has facial puffiness, gained weight. Still having side effects from the prednisone. Feeling overall better as the dose goes down; no worsening of dyspnea as the dose reduced. Had both doses of Pfizer. Son is in [place]. He has recovered from Covid. On Bactrim tiwk and omeprazole. Plan taper to 20mg rest of April, then 10mg in May.\par \par 5/25/21: She is doing very well. Her breathing is nearly back to normal. She's had no worsening as she tapered prednisone to 20mg and then to 10mg. She's gained 10-12 lbs total because of prednisone.\par \par 6/24/21: No problems since seeing me. Down to 5mg w no worsening of symptoms. Weight pretty stable over last month or slightly down. No fevers, no chills, no chest pain. Saw Dr Moulton 2 weeks ago and calcium added to regimen and getting Prolia. Still on HCQ.\par \par 7/7/21: Last visit plan to taper off prednisone and initiate MMF at 500mg bid. Returns today for PFT and check in. Feeling fine. Started MMF without side effects. Had labs Friday, I have not received. 2 more days of prednisone and Bactrim. Notes that she took MMF for her SLE ~7 years ago and tolerated it well.

## 2021-07-15 ENCOUNTER — RX RENEWAL (OUTPATIENT)
Age: 71
End: 2021-07-15

## 2021-07-21 ENCOUNTER — RX RENEWAL (OUTPATIENT)
Age: 71
End: 2021-07-21

## 2021-07-28 ENCOUNTER — APPOINTMENT (OUTPATIENT)
Dept: PULMONOLOGY | Facility: CLINIC | Age: 71
End: 2021-07-28
Payer: MEDICARE

## 2021-07-28 VITALS
HEART RATE: 78 BPM | DIASTOLIC BLOOD PRESSURE: 81 MMHG | WEIGHT: 161 LBS | TEMPERATURE: 97.2 F | SYSTOLIC BLOOD PRESSURE: 120 MMHG | BODY MASS INDEX: 26.82 KG/M2 | HEIGHT: 65 IN | OXYGEN SATURATION: 95 %

## 2021-07-28 PROCEDURE — 94010 BREATHING CAPACITY TEST: CPT

## 2021-07-28 PROCEDURE — 36415 COLL VENOUS BLD VENIPUNCTURE: CPT

## 2021-07-28 PROCEDURE — 99214 OFFICE O/P EST MOD 30 MIN: CPT | Mod: 25

## 2021-07-29 LAB
ALBUMIN SERPL ELPH-MCNC: 4.8 G/DL
ALP BLD-CCNC: 78 U/L
ALT SERPL-CCNC: 19 U/L
ANION GAP SERPL CALC-SCNC: 14 MMOL/L
AST SERPL-CCNC: 25 U/L
BASOPHILS # BLD AUTO: 0.03 K/UL
BASOPHILS NFR BLD AUTO: 0.6 %
BILIRUB SERPL-MCNC: 0.2 MG/DL
BUN SERPL-MCNC: 25 MG/DL
CALCIUM SERPL-MCNC: 10.2 MG/DL
CHLORIDE SERPL-SCNC: 103 MMOL/L
CO2 SERPL-SCNC: 22 MMOL/L
CREAT SERPL-MCNC: 0.74 MG/DL
EOSINOPHIL # BLD AUTO: 0.04 K/UL
EOSINOPHIL NFR BLD AUTO: 0.8 %
GLUCOSE SERPL-MCNC: 110 MG/DL
HCT VFR BLD CALC: 47.5 %
HGB BLD-MCNC: 14.5 G/DL
IMM GRANULOCYTES NFR BLD AUTO: 0.4 %
LYMPHOCYTES # BLD AUTO: 1.35 K/UL
LYMPHOCYTES NFR BLD AUTO: 26.9 %
MAN DIFF?: NORMAL
MCHC RBC-ENTMCNC: 28.4 PG
MCHC RBC-ENTMCNC: 30.5 GM/DL
MCV RBC AUTO: 93 FL
MONOCYTES # BLD AUTO: 0.58 K/UL
MONOCYTES NFR BLD AUTO: 11.6 %
NEUTROPHILS # BLD AUTO: 2.99 K/UL
NEUTROPHILS NFR BLD AUTO: 59.7 %
PLATELET # BLD AUTO: 258 K/UL
POTASSIUM SERPL-SCNC: 4.3 MMOL/L
PROT SERPL-MCNC: 7.4 G/DL
RBC # BLD: 5.11 M/UL
RBC # FLD: 13.4 %
SODIUM SERPL-SCNC: 139 MMOL/L
WBC # FLD AUTO: 5.01 K/UL

## 2021-08-02 NOTE — ASSESSMENT
[FreeTextEntry1] : Data reviewed:\par \par CT chest [facility] 6/2021 : no sig change from December 2020 in peripheral reticulations, mild traction, some ground glass\par \par PFT 11/18/20: FVC 1.59L (56%), TLC 2.26L (47%), DLCO 9.32 (45%)\par PFT 2/24/21: FVC 1.81L (64%), FEV1 1.56L (71%), TLC 2.49L (51%), DLCO 10.82 (52%)\par PFT 7/7/21: FVC 1.55L (55%), FEV1 1.39L (64%), TLC 2.31L (48%), DLCO 8.87 (43%)\par Francis 7/28/21: FVC 1.56L (52%), FEV1 1.27L (54%)\par \par Impression:\par New L>R edema w erythema\par SLE-ILD\par \par Plan:\par Most urgently, LE Doppler to exclude DVT.\par Will also get her labs here today and track down the previous labs from [facility].\par Then will need to d/w Alok Moulton how to manage maintenance therapy.\par Repeat PFT 10/2021.\par Would generally plan to get CT again 6/2022 if she is clinically stable.\par --\par 8/2/21 Spoke to her. Doppler negative, labs ok, will hold MMF for now and re-eval in 2 weeks. If the swelling and redness resolve we can assume it was the drug and choose a different maintenance treatment.

## 2021-08-02 NOTE — HISTORY OF PRESENT ILLNESS
[TextBox_4] : 10/29/2020: Asked to evaluate patient by Dr Sotero Parker for dyspnea; however, I see she was evaluated by Dr Winter for this same problem in July and planned for PFT and, if abnormal, CT chest. She has a background of SLE on HCQ, Dr Moulton. Unclear why the PFT was not done. Reports dyspnea since last year on walking or household activities. Not worsening. Sensation of cough w mucus in throat. No history of lung disease, never smoker. Retired . Has seen Dr Isaac from cardiology, had ECG, echo, stress, reports all normal.\par \par 1/15/21: Returned for follow up. No change in her dyspnea. Has had phone follow up w Dr Moulton, remains on HCQ. Recommended trial of steroids for NSIP.\par \par 2/24/21 [Andres]: Here for follow up. PATEL improved after 2 weeks of being on prednisone 60 mg /day. C/o jitteriness, shakes, insomnia , face swelling, weakness and burning sensation in stomach. Taking bactrim but not PPI. wants to know if her son can meet her who was COVID positive about 1 month ago. Has not taken covid vaccine yet. Tapered prednisone to 40mg --> 30mg.\par \par 4/15/21: Has facial puffiness, gained weight. Still having side effects from the prednisone. Feeling overall better as the dose goes down; no worsening of dyspnea as the dose reduced. Had both doses of Pfizer. Son is in [place]. He has recovered from Covid. On Bactrim tiwk and omeprazole. Plan taper to 20mg rest of April, then 10mg in May.\par \par 5/25/21: She is doing very well. Her breathing is nearly back to normal. She's had no worsening as she tapered prednisone to 20mg and then to 10mg. She's gained 10-12 lbs total because of prednisone.\par \par 6/24/21: No problems since seeing me. Down to 5mg w no worsening of symptoms. Weight pretty stable over last month or slightly down. No fevers, no chills, no chest pain. Saw Dr Moulton 2 weeks ago and calcium added to regimen and getting Prolia. Still on HCQ.\par \par 7/7/21: Last visit plan to taper off prednisone and initiate MMF at 500mg bid. Returns today for PFT and check in. Feeling fine. Started MMF without side effects. Had labs Friday, I have not received. 2 more days of prednisone and Bactrim. Notes that she took MMF for her SLE ~7 years ago and tolerated it well.\par \par 7/28/21: Labs 7/2 were fine. Had labs again 2 weeks ago but I do not have. Stared to get a rash on her chest just this week and also both legs swollen and red just  this week. Breathing is fine. Again she did tolerate Cellcept fine in the past.

## 2021-08-02 NOTE — PHYSICAL EXAM
[Normal Rate/Rhythm] : normal rate/rhythm [Normal S1, S2] : normal s1, s2 [No Murmurs] : no murmurs [No Resp Distress] : no resp distress [No Clubbing] : no clubbing [TextBox_68] : fine crackles at bases [TextBox_80] : there's just very faint erythema over the chest, barely noticeable to me [TextBox_105] : there is new L>R leg edema with erythema, no tenderness

## 2021-08-06 LAB
TPMT ENZYME INTERPRETATION: NORMAL
TPMT ENZYME METHODOLOGY: NORMAL
TPMT ENZYME: 17.7

## 2021-08-11 ENCOUNTER — APPOINTMENT (OUTPATIENT)
Dept: PULMONOLOGY | Facility: CLINIC | Age: 71
End: 2021-08-11
Payer: MEDICARE

## 2021-08-11 VITALS
TEMPERATURE: 97.9 F | DIASTOLIC BLOOD PRESSURE: 84 MMHG | WEIGHT: 161 LBS | BODY MASS INDEX: 26.82 KG/M2 | SYSTOLIC BLOOD PRESSURE: 120 MMHG | HEART RATE: 72 BPM | HEIGHT: 65 IN | OXYGEN SATURATION: 96 %

## 2021-08-11 PROCEDURE — 94010 BREATHING CAPACITY TEST: CPT

## 2021-08-11 PROCEDURE — 99213 OFFICE O/P EST LOW 20 MIN: CPT | Mod: 25

## 2021-08-16 NOTE — HISTORY OF PRESENT ILLNESS
[TextBox_4] : 10/29/2020: Asked to evaluate patient by Dr Sotero Parker for dyspnea; however, I see she was evaluated by Dr Winter for this same problem in July and planned for PFT and, if abnormal, CT chest. She has a background of SLE on HCQ, Dr Moulton. Unclear why the PFT was not done. Reports dyspnea since last year on walking or household activities. Not worsening. Sensation of cough w mucus in throat. No history of lung disease, never smoker. Retired . Has seen Dr Isaac from cardiology, had ECG, echo, stress, reports all normal.\par \par 1/15/21: Returned for follow up. No change in her dyspnea. Has had phone follow up w Dr Moulton, remains on HCQ. Recommended trial of steroids for NSIP.\par \par 2/24/21 [Andres]: Here for follow up. PATEL improved after 2 weeks of being on prednisone 60 mg /day. C/o jitteriness, shakes, insomnia , face swelling, weakness and burning sensation in stomach. Taking bactrim but not PPI. wants to know if her son can meet her who was COVID positive about 1 month ago. Has not taken covid vaccine yet. Tapered prednisone to 40mg --> 30mg.\par \par 4/15/21: Has facial puffiness, gained weight. Still having side effects from the prednisone. Feeling overall better as the dose goes down; no worsening of dyspnea as the dose reduced. Had both doses of Pfizer. Son is in [place]. He has recovered from Covid. On Bactrim tiwk and omeprazole. Plan taper to 20mg rest of April, then 10mg in May.\par \par 5/25/21: She is doing very well. Her breathing is nearly back to normal. She's had no worsening as she tapered prednisone to 20mg and then to 10mg. She's gained 10-12 lbs total because of prednisone.\par \par 6/24/21: No problems since seeing me. Down to 5mg w no worsening of symptoms. Weight pretty stable over last month or slightly down. No fevers, no chills, no chest pain. Saw Dr Moulton 2 weeks ago and calcium added to regimen and getting Prolia. Still on HCQ.\par \par 7/7/21: Last visit plan to taper off prednisone and initiate MMF at 500mg bid. Returns today for PFT and check in. Feeling fine. Started MMF without side effects. Had labs Friday, I have not received. 2 more days of prednisone and Bactrim. Notes that she took MMF for her SLE ~7 years ago and tolerated it well.\par \par 7/28/21: Labs 7/2 were fine. Had labs again 2 weeks ago but I do not have. Stared to get a rash on her chest just this week and also both legs swollen and red just  this week. Breathing is fine. Again she did tolerate Cellcept fine in the past.\par \par 8/11/21: We stopped the Cellcept not quite 2 weeks ago. Rash on chest is gone. She thinks legs are better - they look the same to me. Labs were fine, ultrasound was fine. Last echo was 2 years ago w Dr Isaac at [facility]. No orthopnea.

## 2021-08-16 NOTE — CONSULT LETTER
[Dear  ___] : Dear  [unfilled], [Courtesy Letter:] : I had the pleasure of seeing your patient, [unfilled], in my office today. [Sincerely,] : Sincerely, [___] : [unfilled] [FreeTextEntry2] : Misael Moulton MD\par [address] [place] [address]\[place] [address] [FreeTextEntry1] : She called me reporting new leg edema after starting MMF. Routine labs were normal and ultrasound negative for clot. I've held the MMF and while she thinks the edema is better, it looks the same to me. She had also reported some erythema on her chest. This was barely noticeable to me. She feels this has resolved now. I'm curious what you think we should do about maintenance at this point and would be glad to talk after you have a chance to see her. [FreeTextEntry3] : Marycarmen Benitez MD, FCCP\par

## 2021-08-16 NOTE — PHYSICAL EXAM
[Normal Rate/Rhythm] : normal rate/rhythm [Normal S1, S2] : normal s1, s2 [No Murmurs] : no murmurs [No Resp Distress] : no resp distress [No Clubbing] : no clubbing [TextBox_68] : fine crackles at bases [TextBox_105] : persistent L>R leg edema with erythema, no tenderness

## 2021-08-16 NOTE — ASSESSMENT
[FreeTextEntry1] : Data reviewed:\par \par TPMP normal (8/2021)\par \par CT chest [facility] 6/2021 : no sig change from December 2020 in peripheral reticulations, mild traction, some ground glass\par \par PFT 11/18/20: FVC 1.59L (56%), TLC 2.26L (47%), DLCO 9.32 (45%)\par PFT 2/24/21: FVC 1.81L (64%), FEV1 1.56L (71%), TLC 2.49L (51%), DLCO 10.82 (52%)\par PFT 7/7/21: FVC 1.55L (55%), FEV1 1.39L (64%), TLC 2.31L (48%), DLCO 8.87 (43%)\par Francis 7/28/21: FVC 1.56L (52%), FEV1 1.27L (54%)\par Francis 8/11/21: FVC 1.49L (50%), FEV1 1.35L (58%)\par \par Impression:\par L>R edema w erythema, happened on Cellcept, not really better now off Cellcept\par SLE-ILD\par \par Plan:\par I don't know if the new swelling is from MMF. The patient thinks it has improved somewhat but it's certainly still present and looks the same to me. Labs were unremarkable. Doppler was negative for clot. Not orthopneic. I asked her to see Dr Moulton for his opinion as to could the edema be related to her SLE, and should we resume MMF or select a different maintenance treatment. Could repeat echo, defer for now.\par Generally planning to repeat PFT 10/2021, CT 6/2022.\par --\par Spoke to Dr Moulton, he will try tacrolimus and avoid MMF for now.\par

## 2021-08-17 ENCOUNTER — NON-APPOINTMENT (OUTPATIENT)
Age: 71
End: 2021-08-17

## 2021-09-07 ENCOUNTER — LABORATORY RESULT (OUTPATIENT)
Age: 71
End: 2021-09-07

## 2021-09-09 ENCOUNTER — APPOINTMENT (OUTPATIENT)
Dept: PULMONOLOGY | Facility: CLINIC | Age: 71
End: 2021-09-09
Payer: MEDICARE

## 2021-09-09 VITALS
OXYGEN SATURATION: 96 % | HEART RATE: 89 BPM | SYSTOLIC BLOOD PRESSURE: 114 MMHG | TEMPERATURE: 98 F | DIASTOLIC BLOOD PRESSURE: 79 MMHG | BODY MASS INDEX: 26.82 KG/M2 | HEIGHT: 65 IN | WEIGHT: 161 LBS

## 2021-09-09 PROCEDURE — 94060 EVALUATION OF WHEEZING: CPT

## 2021-09-09 PROCEDURE — 94729 DIFFUSING CAPACITY: CPT

## 2021-09-09 PROCEDURE — 99213 OFFICE O/P EST LOW 20 MIN: CPT | Mod: 25

## 2021-09-09 PROCEDURE — 94727 GAS DIL/WSHOT DETER LNG VOL: CPT

## 2021-10-25 ENCOUNTER — RX RENEWAL (OUTPATIENT)
Age: 71
End: 2021-10-25

## 2021-12-07 NOTE — HISTORY OF PRESENT ILLNESS
[TextBox_4] : 10/29/2020: Asked to evaluate patient by Dr Sotero Parker for dyspnea; however, I see she was evaluated by Dr Winter for this same problem in July and planned for PFT and, if abnormal, CT chest. She has a background of SLE on HCQ, Dr Moulton. Unclear why the PFT was not done. Reports dyspnea since last year on walking or household activities. Not worsening. Sensation of cough w mucus in throat. No history of lung disease, never smoker. Retired . Has seen Dr Isaac from cardiology, had ECG, echo, stress, reports all normal.\par \par 1/15/21: Returned for follow up. No change in her dyspnea. Has had phone follow up w Dr Moulton, remains on HCQ. Recommended trial of steroids for NSIP.\par \par 2/24/21 [Andres]: Here for follow up. PATEL improved after 2 weeks of being on prednisone 60 mg /day. C/o jitteriness, shakes, insomnia , face swelling, weakness and burning sensation in stomach. Taking bactrim but not PPI. wants to know if her son can meet her who was COVID positive about 1 month ago. Has not taken covid vaccine yet. Tapered prednisone to 40mg --> 30mg.\par \par 4/15/21: Has facial puffiness, gained weight. Still having side effects from the prednisone. Feeling overall better as the dose goes down; no worsening of dyspnea as the dose reduced. Had both doses of Pfizer. Son is in [place]. He has recovered from Covid. On Bactrim tiwk and omeprazole. Plan taper to 20mg rest of April, then 10mg in May.\par \par 5/25/21: She is doing very well. Her breathing is nearly back to normal. She's had no worsening as she tapered prednisone to 20mg and then to 10mg. She's gained 10-12 lbs total because of prednisone.\par \par 6/24/21: No problems since seeing me. Down to 5mg w no worsening of symptoms. Weight pretty stable over last month or slightly down. No fevers, no chills, no chest pain. Saw Dr Moulton 2 weeks ago and calcium added to regimen and getting Prolia. Still on HCQ.\par \par 7/7/21: Last visit plan to taper off prednisone and initiate MMF at 500mg bid. Returns today for PFT and check in. Feeling fine. Started MMF without side effects. Had labs Friday, I have not received. 2 more days of prednisone and Bactrim. Notes that she took MMF for her SLE ~7 years ago and tolerated it well.\par \par 7/28/21: Labs 7/2 were fine. Had labs again 2 weeks ago but I do not have. Stared to get a rash on her chest just this week and also both legs swollen and red just  this week. Breathing is fine. Again she did tolerate Cellcept fine in the past.\par \par 8/11/21: We stopped the Cellcept not quite 2 weeks ago. Rash on chest is gone. She thinks legs are better - they look the same to me. Labs were fine, ultrasound was fine. Last echo was 2 years ago w Dr Isaac at [facility]. No orthopnea.\par \par 9/9/21: She reports that her lungs feel much better but she has no energy, very fatigued. This coincided with starting tacrolimus from Dr Moulton on 8/12. She also has a feeling of itchiness inside her body that keeps her from sleeping. Sees Dr Moulton next week. Swelling in her legs disappeared. Lucy Barrigah stopped her hctz.

## 2021-12-07 NOTE — ASSESSMENT
[FreeTextEntry1] : Data reviewed:\par \par TPMP normal (8/2021)\par \par CT chest [facility] 6/2021 : no sig change from December 2020 in peripheral reticulations, mild traction, some ground glass\par \par PFT 11/18/20: FVC 1.59L (56%), TLC 2.26L (47%), DLCO 9.32 (45%)\par PFT 2/24/21: FVC 1.81L (64%), FEV1 1.56L (71%), TLC 2.49L (51%), DLCO 10.82 (52%)\par PFT 7/7/21: FVC 1.55L (55%), FEV1 1.39L (64%), TLC 2.31L (48%), DLCO 8.87 (43%)\par [place] 7/28/21: FVC 1.56L (52%), FEV1 1.27L (54%)\par [place] 8/11/21: FVC 1.49L (50%), FEV1 1.35L (58%)\par PFT 9/9/21: FVC 1.64L (59%), FEV1 1.42L (66%), TLC 2.41L (50%), DLCO 8.04 (39%)\par \par Impression:\par SLE-ILD, steroids tapered off, didn't tolerate MMF, now on tacrolimus with some side effects\par \par Plan:\par Lung function is stable. Seeing Dr Moulton next week and can review side effects, also recommendation for pneumococcal vax. I did not send labs today in case Dr Moulton wants more than CBC, CMP.\par Generally planning to repeat CT 6/2022; see me in 3 mos w repeat PFT.\par --\par Echo 10/2021 ACP:\par Normal LV systolic function and mild diastolic dysfunction.\par Normal right side.\par Mild MR. Mild TR. Est RVSP 32 mmHg, normal.\par

## 2021-12-07 NOTE — CONSULT LETTER
[Dear  ___] : Dear  [unfilled], [Courtesy Letter:] : I had the pleasure of seeing your patient, [unfilled], in my office today. [Please see my note below.] : Please see my note below. [Consult Closing:] : Thank you very much for allowing me to participate in the care of this patient.  If you have any questions, please do not hesitate to contact me. [Sincerely,] : Sincerely, [___] : [unfilled] [FreeTextEntry2] : Misael Moulton MD\par [address] [place] [address]\[place] [address] [FreeTextEntry3] : Marycarmen Benitez MD, FCCP\par

## 2021-12-09 ENCOUNTER — APPOINTMENT (OUTPATIENT)
Dept: PULMONOLOGY | Facility: CLINIC | Age: 71
End: 2021-12-09
Payer: MEDICARE

## 2021-12-09 VITALS
HEART RATE: 83 BPM | DIASTOLIC BLOOD PRESSURE: 70 MMHG | SYSTOLIC BLOOD PRESSURE: 110 MMHG | OXYGEN SATURATION: 98 % | BODY MASS INDEX: 26.82 KG/M2 | WEIGHT: 161 LBS | TEMPERATURE: 98 F | HEIGHT: 65 IN

## 2021-12-09 PROCEDURE — 99213 OFFICE O/P EST LOW 20 MIN: CPT

## 2021-12-12 LAB — SARS-COV-2 N GENE NPH QL NAA+PROBE: NOT DETECTED

## 2021-12-14 ENCOUNTER — APPOINTMENT (OUTPATIENT)
Dept: PULMONOLOGY | Facility: CLINIC | Age: 71
End: 2021-12-14
Payer: MEDICARE

## 2021-12-14 PROCEDURE — 94727 GAS DIL/WSHOT DETER LNG VOL: CPT

## 2021-12-14 PROCEDURE — 94060 EVALUATION OF WHEEZING: CPT

## 2021-12-14 PROCEDURE — 94729 DIFFUSING CAPACITY: CPT

## 2021-12-14 NOTE — HISTORY OF PRESENT ILLNESS
[TextBox_4] : 10/29/2020: Asked to evaluate patient by Dr Sotero Parker for dyspnea; however, I see she was evaluated by Dr Winter for this same problem in July and planned for PFT and, if abnormal, CT chest. She has a background of SLE on HCQ, Dr Moulton. Unclear why the PFT was not done. Reports dyspnea since last year on walking or household activities. Not worsening. Sensation of cough w mucus in throat. No history of lung disease, never smoker. Retired . Has seen Dr Isaac from cardiology, had ECG, echo, stress, reports all normal.\par \par 1/15/21: Returned for follow up. No change in her dyspnea. Has had phone follow up w Dr Moulton, remains on HCQ. Recommended trial of steroids for NSIP.\par \par 2/24/21 [Andres]: Here for follow up. PATEL improved after 2 weeks of being on prednisone 60 mg /day. C/o jitteriness, shakes, insomnia , face swelling, weakness and burning sensation in stomach. Taking bactrim but not PPI. wants to know if her son can meet her who was COVID positive about 1 month ago. Has not taken covid vaccine yet. Tapered prednisone to 40mg --> 30mg.\par \par 4/15/21: Has facial puffiness, gained weight. Still having side effects from the prednisone. Feeling overall better as the dose goes down; no worsening of dyspnea as the dose reduced. Had both doses of Pfizer. Son is in [place]. He has recovered from Covid. On Bactrim tiwk and omeprazole. Plan taper to 20mg rest of April, then 10mg in May.\par \par 5/25/21: She is doing very well. Her breathing is nearly back to normal. She's had no worsening as she tapered prednisone to 20mg and then to 10mg. She's gained 10-12 lbs total because of prednisone.\par \par 6/24/21: No problems since seeing me. Down to 5mg w no worsening of symptoms. Weight pretty stable over last month or slightly down. No fevers, no chills, no chest pain. Saw Dr Moulton 2 weeks ago and calcium added to regimen and getting Prolia. Still on HCQ.\par \par 7/7/21: Last visit plan to taper off prednisone and initiate MMF at 500mg bid. Returns today for PFT and check in. Feeling fine. Started MMF without side effects. Had labs Friday, I have not received. 2 more days of prednisone and Bactrim. Notes that she took MMF for her SLE ~7 years ago and tolerated it well.\par \par 7/28/21: Labs 7/2 were fine. Had labs again 2 weeks ago but I do not have. Stared to get a rash on her chest just this week and also both legs swollen and red just  this week. Breathing is fine. Again she did tolerate Cellcept fine in the past.\par \par 8/11/21: We stopped the Cellcept not quite 2 weeks ago. Rash on chest is gone. She thinks legs are better - they look the same to me. Labs were fine, ultrasound was fine. Last echo was 2 years ago w Dr Isaac at [facility]. No orthopnea.\par \par 9/9/21: She reports that her lungs feel much better but she has no energy, very fatigued. This coincided with starting tacrolimus from Dr Moulton on 8/12. She also has a feeling of itchiness inside her body that keeps her from sleeping. Sees Dr Moulton next week. Swelling in her legs disappeared. Lucy Chapa stopped her hctz.\par \par 12/9/21: Is doing ok. Continues on tacro with Dr Moulton. Sees him 12/22. A little dyspneic, fatigued on walking. Got flu shot, pneumonia shot, and Covid booster. Saw Dr Isaac, had echo. No more edema. Didn't have Covid swab.

## 2021-12-14 NOTE — PHYSICAL EXAM
[Normal Rate/Rhythm] : normal rate/rhythm [Normal S1, S2] : normal s1, s2 [No Murmurs] : no murmurs [No Resp Distress] : no resp distress [No Clubbing] : no clubbing [TextBox_68] : fine crackles at bases

## 2021-12-14 NOTE — ASSESSMENT
[FreeTextEntry1] : Data reviewed:\par \par Echo 10/2021 ACP:\par Normal LV systolic function and mild diastolic dysfunction.\par Normal right side.\par Mild MR. Mild TR. Est RVSP 32 mmHg, normal.\par \par TPMP normal (8/2021)\par \par CT chest [facility] 6/2021 : no sig change from December 2020 in peripheral reticulations, mild traction, some ground glass\par \par PFT 11/18/20: FVC 1.59L (56%), TLC 2.26L (47%), DLCO 9.32 (45%)\par PFT 2/24/21: FVC 1.81L (64%), FEV1 1.56L (71%), TLC 2.49L (51%), DLCO 10.82 (52%)\par PFT 7/7/21: FVC 1.55L (55%), FEV1 1.39L (64%), TLC 2.31L (48%), DLCO 8.87 (43%)\par [place] 7/28/21: FVC 1.56L (52%), FEV1 1.27L (54%)\par [place] 8/11/21: FVC 1.49L (50%), FEV1 1.35L (58%)\par PFT 9/9/21: FVC 1.64L (59%), FEV1 1.42L (66%), TLC 2.41L (50%), DLCO 8.04 (39%)\par \par Impression:\par SLE-ILD, steroids tapered off, didn't tolerate MMF, now on tacrolimus\par \par Plan:\par Covid swab done and PFT to be done early next week.\par Generally planning to repeat CT 6/2022 and cont approx q 3 mos PFT.\par --\par PFT 12/14/21: FVC 1.54L (56%), FEV1 1.34L (64%), TLC 2.08L (44%), DLCO 7.58 (37%)\par Repeat 3 mos. Sees Dr Moulton later this month.\par \par

## 2022-03-07 ENCOUNTER — LABORATORY RESULT (OUTPATIENT)
Age: 72
End: 2022-03-07

## 2022-03-10 ENCOUNTER — APPOINTMENT (OUTPATIENT)
Dept: PULMONOLOGY | Facility: CLINIC | Age: 72
End: 2022-03-10
Payer: MEDICARE

## 2022-03-10 VITALS
TEMPERATURE: 97.9 F | HEART RATE: 86 BPM | BODY MASS INDEX: 25.2 KG/M2 | SYSTOLIC BLOOD PRESSURE: 112 MMHG | HEIGHT: 65 IN | OXYGEN SATURATION: 98 % | DIASTOLIC BLOOD PRESSURE: 70 MMHG | WEIGHT: 151.25 LBS

## 2022-03-10 PROCEDURE — 94729 DIFFUSING CAPACITY: CPT

## 2022-03-10 PROCEDURE — 94060 EVALUATION OF WHEEZING: CPT

## 2022-03-10 PROCEDURE — 94727 GAS DIL/WSHOT DETER LNG VOL: CPT

## 2022-03-10 PROCEDURE — 99213 OFFICE O/P EST LOW 20 MIN: CPT | Mod: 25

## 2022-03-31 NOTE — HISTORY OF PRESENT ILLNESS
[TextBox_4] : 10/29/2020: Asked to evaluate patient by Dr Sotero Parker for dyspnea; however, I see she was evaluated by Dr Winter for this same problem in July and planned for PFT and, if abnormal, CT chest. She has a background of SLE on HCQ, Dr Moulton. Unclear why the PFT was not done. Reports dyspnea since last year on walking or household activities. Not worsening. Sensation of cough w mucus in throat. No history of lung disease, never smoker. Retired . Has seen Dr Isaac from cardiology, had ECG, echo, stress, reports all normal.\par \par 1/15/21: Returned for follow up. No change in her dyspnea. Has had phone follow up w Dr Moulton, remains on HCQ. Recommended trial of steroids for NSIP.\par \par 2/24/21 [Andres]: Here for follow up. PATEL improved after 2 weeks of being on prednisone 60 mg /day. C/o jitteriness, shakes, insomnia , face swelling, weakness and burning sensation in stomach. Taking bactrim but not PPI. wants to know if her son can meet her who was COVID positive about 1 month ago. Has not taken covid vaccine yet. Tapered prednisone to 40mg --> 30mg.\par \par 4/15/21: Has facial puffiness, gained weight. Still having side effects from the prednisone. Feeling overall better as the dose goes down; no worsening of dyspnea as the dose reduced. Had both doses of Pfizer. Son is in [place]. He has recovered from Covid. On Bactrim tiwk and omeprazole. Plan taper to 20mg rest of April, then 10mg in May.\par \par 5/25/21: She is doing very well. Her breathing is nearly back to normal. She's had no worsening as she tapered prednisone to 20mg and then to 10mg. She's gained 10-12 lbs total because of prednisone.\par \par 6/24/21: No problems since seeing me. Down to 5mg w no worsening of symptoms. Weight pretty stable over last month or slightly down. No fevers, no chills, no chest pain. Saw Dr Moulton 2 weeks ago and calcium added to regimen and getting Prolia. Still on HCQ.\par \par 7/7/21: Last visit plan to taper off prednisone and initiate MMF at 500mg bid. Returns today for PFT and check in. Feeling fine. Started MMF without side effects. Had labs Friday, I have not received. 2 more days of prednisone and Bactrim. Notes that she took MMF for her SLE ~7 years ago and tolerated it well.\par \par 7/28/21: Labs 7/2 were fine. Had labs again 2 weeks ago but I do not have. Stared to get a rash on her chest just this week and also both legs swollen and red just  this week. Breathing is fine. Again she did tolerate Cellcept fine in the past.\par \par 8/11/21: We stopped the Cellcept not quite 2 weeks ago. Rash on chest is gone. She thinks legs are better - they look the same to me. Labs were fine, ultrasound was fine. Last echo was 2 years ago w Dr Isaac at [facility]. No orthopnea.\par \par 9/9/21: She reports that her lungs feel much better but she has no energy, very fatigued. This coincided with starting tacrolimus from Dr Moulton on 8/12. She also has a feeling of itchiness inside her body that keeps her from sleeping. Sees Dr Moulton next week. Swelling in her legs disappeared. Lucy Chapa stopped her hctz.\par \par 12/9/21: Is doing ok. Continues on tacro with Dr Moulton. Sees him 12/22. A little dyspneic, fatigued on walking. Got flu shot, pneumonia shot, and Covid booster. Saw Dr Isaac, had echo. No more edema. Didn't have Covid swab.\par \par 3/10/22: She has been ok. She remains on tacro. She saw Dr Isaac and is having a coronary CTA tomorrow at [facility]. Starting Prolia for osteoporosis. Did not have Covid.

## 2022-03-31 NOTE — ASSESSMENT
[FreeTextEntry1] : Data reviewed:\par \par Echo 10/2021 ACP:\par Normal LV systolic function and mild diastolic dysfunction.\par Normal right side.\par Mild MR. Mild TR. Est RVSP 32 mmHg, normal.\par \par TPMT normal (8/2021)\par \par CT chest [facility] 6/2021 : no sig change from December 2020 in peripheral reticulations, mild traction, some ground glass\par \par PFT 11/18/20: FVC 1.59L (56%), TLC 2.26L (47%), DLCO 9.32 (45%)\par PFT 2/24/21: FVC 1.81L (64%), FEV1 1.56L (71%), TLC 2.49L (51%), DLCO 10.82 (52%)\par PFT 7/7/21: FVC 1.55L (55%), FEV1 1.39L (64%), TLC 2.31L (48%), DLCO 8.87 (43%)\par [place] 7/28/21: FVC 1.56L (52%), FEV1 1.27L (54%)\par [place] 8/11/21: FVC 1.49L (50%), FEV1 1.35L (58%)\par PFT 9/9/21: FVC 1.64L (59%), FEV1 1.42L (66%), TLC 2.41L (50%), DLCO 8.04 (39%)\par PFT 12/14/21: FVC 1.54L (56%), FEV1 1.34L (64%), TLC 2.08L (44%), DLCO 7.58 (37%)\par PFT 3/10/22: FVC 1.62L (59%), FEV1 1.42L (68%), TLC 2.39L (50%), DLCO 7.63 (38%)\par \par Impression:\par SLE-ILD, steroids tapered off, didn't tolerate MMF, now on tacrolimus\par \par Plan:\par Doing well, PFT is stable.\par Will try to add on HRCT along w cardiac CTA tomorrow.\par Follow 3-4 mos w PFT.\par --\par CT chest [facility] 3/2022 personally reviewed : again no sig change in ILD features as noted above: peripheral reticulation, traction, some ground glass\par

## 2022-03-31 NOTE — CONSULT LETTER
[Dear  ___] : Dear  [unfilled], [Courtesy Letter:] : I had the pleasure of seeing your patient, [unfilled], in my office today. [Please see my note below.] : Please see my note below. [Consult Closing:] : Thank you very much for allowing me to participate in the care of this patient.  If you have any questions, please do not hesitate to contact me. [Sincerely,] : Sincerely, [___] : [unfilled] [FreeTextEntry2] : Misael Moulton MD\par [address] [place] [address]\[place] [address] [FreeTextEntry3] : Marycarmen Benitez MD, FCCP\par

## 2022-04-21 ENCOUNTER — APPOINTMENT (OUTPATIENT)
Dept: NEPHROLOGY | Facility: CLINIC | Age: 72
End: 2022-04-21
Payer: MEDICARE

## 2022-04-21 ENCOUNTER — NON-APPOINTMENT (OUTPATIENT)
Age: 72
End: 2022-04-21

## 2022-04-21 VITALS
SYSTOLIC BLOOD PRESSURE: 128 MMHG | WEIGHT: 151 LBS | HEART RATE: 80 BPM | BODY MASS INDEX: 25.16 KG/M2 | HEIGHT: 65 IN | DIASTOLIC BLOOD PRESSURE: 74 MMHG

## 2022-04-21 LAB
CYSTATIN C SERPL-MCNC: 1.09 MG/L
GFR/BSA.PRED SERPLBLD CYS-BASED-ARV: 61 ML/MIN/1.73M2

## 2022-04-21 PROCEDURE — 99205 OFFICE O/P NEW HI 60 MIN: CPT

## 2022-04-21 NOTE — PHYSICAL EXAM
[General Appearance - Alert] : alert [General Appearance - In No Acute Distress] : in no acute distress [Sclera] : the sclera and conjunctiva were normal [PERRL With Normal Accommodation] : pupils were equal in size, round, and reactive to light [Outer Ear] : the ears and nose were normal in appearance [Neck Appearance] : the appearance of the neck was normal [Neck Cervical Mass (___cm)] : no neck mass was observed [Jugular Venous Distention Increased] : there was no jugular-venous distention [Auscultation Breath Sounds / Voice Sounds] : lungs were clear to auscultation bilaterally [Heart Rate And Rhythm] : heart rate was normal and rhythm regular [Heart Sounds] : normal S1 and S2 [Heart Sounds Gallop] : no gallops [Murmurs] : no murmurs [Heart Sounds Pericardial Friction Rub] : no pericardial rub [Edema] : there was no peripheral edema [Skin Color & Pigmentation] : normal skin color and pigmentation [Skin Turgor] : normal skin turgor [] : no rash [Deep Tendon Reflexes (DTR)] : deep tendon reflexes were 2+ and symmetric [No Focal Deficits] : no focal deficits [Oriented To Time, Place, And Person] : oriented to person, place, and time [Affect] : the affect was normal [Impaired Insight] : insight and judgment were intact

## 2022-04-21 NOTE — REVIEW OF SYSTEMS
[Shortness Of Breath] : shortness of breath [SOB on Exertion] : shortness of breath during exertion [Heartburn] : heartburn [Negative] : Heme/Lymph

## 2022-04-25 LAB
ANION GAP SERPL CALC-SCNC: 15 MMOL/L
APPEARANCE: CLEAR
BACTERIA: NEGATIVE
BILIRUBIN URINE: NEGATIVE
BLOOD URINE: NEGATIVE
BUN SERPL-MCNC: 31 MG/DL
C3 SERPL-MCNC: 141 MG/DL
C4 SERPL-MCNC: 34 MG/DL
CALCIUM SERPL-MCNC: 10.4 MG/DL
CALCIUM SERPL-MCNC: 10.4 MG/DL
CHLORIDE SERPL-SCNC: 106 MMOL/L
CO2 SERPL-SCNC: 23 MMOL/L
COLOR: NORMAL
CREAT SERPL-MCNC: 0.93 MG/DL
CREAT SPEC-SCNC: 73 MG/DL
EGFR: 65 ML/MIN/1.73M2
ERYTHROCYTE [SEDIMENTATION RATE] IN BLOOD BY WESTERGREN METHOD: 15 MM/HR
GLUCOSE QUALITATIVE U: NEGATIVE
GLUCOSE SERPL-MCNC: 105 MG/DL
HYALINE CASTS: 1 /LPF
KETONES URINE: NEGATIVE
LEUKOCYTE ESTERASE URINE: NEGATIVE
MICROALBUMIN 24H UR DL<=1MG/L-MCNC: <1.2 MG/DL
MICROALBUMIN/CREAT 24H UR-RTO: NORMAL MG/G
MICROSCOPIC-UA: NORMAL
NITRITE URINE: NEGATIVE
PARATHYROID HORMONE INTACT: 119 PG/ML
PH URINE: 7
POTASSIUM SERPL-SCNC: 4.5 MMOL/L
PROTEIN URINE: NORMAL
RED BLOOD CELLS URINE: 1 /HPF
SODIUM SERPL-SCNC: 143 MMOL/L
SPECIFIC GRAVITY URINE: 1.02
SQUAMOUS EPITHELIAL CELLS: 1 /HPF
UROBILINOGEN URINE: NORMAL
WHITE BLOOD CELLS URINE: 3 /HPF

## 2022-06-15 ENCOUNTER — APPOINTMENT (OUTPATIENT)
Dept: PULMONOLOGY | Facility: CLINIC | Age: 72
End: 2022-06-15
Payer: MEDICARE

## 2022-06-15 VITALS
DIASTOLIC BLOOD PRESSURE: 80 MMHG | TEMPERATURE: 98.2 F | OXYGEN SATURATION: 96 % | HEART RATE: 83 BPM | HEIGHT: 65 IN | WEIGHT: 151 LBS | SYSTOLIC BLOOD PRESSURE: 112 MMHG | BODY MASS INDEX: 25.16 KG/M2

## 2022-06-15 PROCEDURE — 94060 EVALUATION OF WHEEZING: CPT

## 2022-06-15 PROCEDURE — 90732 PPSV23 VACC 2 YRS+ SUBQ/IM: CPT

## 2022-06-15 PROCEDURE — 94727 GAS DIL/WSHOT DETER LNG VOL: CPT

## 2022-06-15 PROCEDURE — G0009: CPT

## 2022-06-15 PROCEDURE — 99213 OFFICE O/P EST LOW 20 MIN: CPT | Mod: 25

## 2022-06-15 PROCEDURE — 94729 DIFFUSING CAPACITY: CPT

## 2022-06-15 NOTE — PHYSICAL EXAM
[Normal Rate/Rhythm] : normal rate/rhythm [Normal S1, S2] : normal s1, s2 [No Murmurs] : no murmurs [No Resp Distress] : no resp distress [No Clubbing] : no clubbing [Normal Affect] : normal affect [TextBox_68] : fine crackles at bases

## 2022-06-15 NOTE — ASSESSMENT
[FreeTextEntry1] : Data reviewed:\par \par Echo 10/2021 ACP:\par Normal LV systolic function and mild diastolic dysfunction.\par Normal right side.\par Mild MR. Mild TR. Est RVSP 32 mmHg, normal.\par \par TPMT normal (8/2021)\par \par CT chest [facility] 3/2022 personally reviewed : again no sig change in ILD features as noted above: peripheral reticulation, traction, some ground glass\par \par PFT 11/18/20: FVC 1.59L (56%), TLC 2.26L (47%), DLCO 9.32 (45%)\par PFT 2/24/21: FVC 1.81L (64%), FEV1 1.56L (71%), TLC 2.49L (51%), DLCO 10.82 (52%)\par PFT 7/7/21: FVC 1.55L (55%), FEV1 1.39L (64%), TLC 2.31L (48%), DLCO 8.87 (43%)\par [place] 7/28/21: FVC 1.56L (52%), FEV1 1.27L (54%)\par [place] 8/11/21: FVC 1.49L (50%), FEV1 1.35L (58%)\par PFT 9/9/21: FVC 1.64L (59%), FEV1 1.42L (66%), TLC 2.41L (50%), DLCO 8.04 (39%)\par PFT 12/14/21: FVC 1.54L (56%), FEV1 1.34L (64%), TLC 2.08L (44%), DLCO 7.58 (37%)\par PFT 3/10/22: FVC 1.62L (59%), FEV1 1.42L (68%), TLC 2.39L (50%), DLCO 7.63 (38%)\par PFT 3/10/22: FVC 1.67L (61%), FEV1 1.43L (68%), TLC 2.20L (46%), DLCO 7.94 (39%)\par \par Impression:\par SLE-ILD, steroids tapered off, didn't tolerate MMF, now on tacrolimus\par Clinically stable\par \par Plan:\par Doing well, PFT is stable.\par Return 3 mos w PFT (her choice - could go a little longer).\par Will call me if she would like to pursue rehab.\par [id number] given to complete pneumococcal vax series.

## 2022-06-15 NOTE — CONSULT LETTER
[Dear  ___] : Dear  [unfilled], [Courtesy Letter:] : I had the pleasure of seeing your patient, [unfilled], in my office today. [Please see my note below.] : Please see my note below. [Consult Closing:] : Thank you very much for allowing me to participate in the care of this patient.  If you have any questions, please do not hesitate to contact me. [Sincerely,] : Sincerely, [FreeTextEntry2] : Misael Moulton MD\par [address] [place] [address]\[place] [address] [FreeTextEntry3] : Marycarmen Benitez MD, FCCP\par  [___] : [unfilled]

## 2022-06-15 NOTE — HISTORY OF PRESENT ILLNESS
[TextBox_4] : 10/29/2020: Asked to evaluate patient by Dr Sotero Parker for dyspnea; however, I see she was evaluated by Dr Winter for this same problem in July and planned for PFT and, if abnormal, CT chest. She has a background of SLE on HCQ, Dr Moulton. Unclear why the PFT was not done. Reports dyspnea since last year on walking or household activities. Not worsening. Sensation of cough w mucus in throat. No history of lung disease, never smoker. Retired . Has seen Dr Isaac from cardiology, had ECG, echo, stress, reports all normal.\par \par 1/15/21: Returned for follow up. No change in her dyspnea. Has had phone follow up w Dr Moulton, remains on HCQ. Recommended trial of steroids for NSIP.\par \par 2/24/21 [Andres]: Here for follow up. PATEL improved after 2 weeks of being on prednisone 60 mg /day. C/o jitteriness, shakes, insomnia , face swelling, weakness and burning sensation in stomach. Taking bactrim but not PPI. wants to know if her son can meet her who was COVID positive about 1 month ago. Has not taken covid vaccine yet. Tapered prednisone to 40mg --> 30mg.\par \par 4/15/21: Has facial puffiness, gained weight. Still having side effects from the prednisone. Feeling overall better as the dose goes down; no worsening of dyspnea as the dose reduced. Had both doses of Pfizer. Son is in [place]. He has recovered from Covid. On Bactrim tiwk and omeprazole. Plan taper to 20mg rest of April, then 10mg in May.\par \par 5/25/21: She is doing very well. Her breathing is nearly back to normal. She's had no worsening as she tapered prednisone to 20mg and then to 10mg. She's gained 10-12 lbs total because of prednisone.\par \par 6/24/21: No problems since seeing me. Down to 5mg w no worsening of symptoms. Weight pretty stable over last month or slightly down. No fevers, no chills, no chest pain. Saw Dr Moulton 2 weeks ago and calcium added to regimen and getting Prolia. Still on HCQ.\par \par 7/7/21: Last visit plan to taper off prednisone and initiate MMF at 500mg bid. Returns today for PFT and check in. Feeling fine. Started MMF without side effects. Had labs Friday, I have not received. 2 more days of prednisone and Bactrim. Notes that she took MMF for her SLE ~7 years ago and tolerated it well.\par \par 7/28/21: Labs 7/2 were fine. Had labs again 2 weeks ago but I do not have. Stared to get a rash on her chest just this week and also both legs swollen and red just  this week. Breathing is fine. Again she did tolerate Cellcept fine in the past.\par \par 8/11/21: We stopped the Cellcept not quite 2 weeks ago. Rash on chest is gone. She thinks legs are better - they look the same to me. Labs were fine, ultrasound was fine. Last echo was 2 years ago w Dr Isaac at [facility]. No orthopnea.\par \par 9/9/21: She reports that her lungs feel much better but she has no energy, very fatigued. This coincided with starting tacrolimus from Dr Moulton on 8/12. She also has a feeling of itchiness inside her body that keeps her from sleeping. Sees Dr Moulton next week. Swelling in her legs disappeared. Lucy Chapa stopped her hctz.\par \par 12/9/21: Is doing ok. Continues on tacro with Dr Moulton. Sees him 12/22. A little dyspneic, fatigued on walking. Got flu shot, pneumonia shot, and Covid booster. Saw Dr Isaac, had echo. No more edema. Didn't have Covid swab.\par \par 3/10/22: She has been ok. She remains on tacro. She saw Dr Isaac and is having a coronary CTA tomorrow at [facility]. Starting Prolia for osteoporosis. Did not have Covid.\par \par 6/15/22: She feels ok. She feels fatigued on walking. She saw Dr Isaac who ruled out cardiac causes of dyspnea. Remains on tacrolimus and needs to see Dr Moulton. We talked about rehab and she might be interested. [ESS] : 10

## 2022-06-27 ENCOUNTER — APPOINTMENT (OUTPATIENT)
Age: 72
End: 2022-06-27

## 2022-06-27 VITALS
HEART RATE: 96 BPM | OXYGEN SATURATION: 96 % | SYSTOLIC BLOOD PRESSURE: 110 MMHG | HEIGHT: 65 IN | DIASTOLIC BLOOD PRESSURE: 80 MMHG | TEMPERATURE: 96.4 F | WEIGHT: 146 LBS | BODY MASS INDEX: 24.32 KG/M2 | RESPIRATION RATE: 14 BRPM

## 2022-06-27 DIAGNOSIS — Z80.0 FAMILY HISTORY OF MALIGNANT NEOPLASM OF DIGESTIVE ORGANS: ICD-10-CM

## 2022-06-27 PROCEDURE — 99204 OFFICE O/P NEW MOD 45 MIN: CPT

## 2022-06-27 RX ORDER — PREDNISONE 2.5 MG/1
2.5 TABLET ORAL
Qty: 14 | Refills: 0 | Status: DISCONTINUED | COMMUNITY
Start: 2021-01-15 | End: 2022-06-27

## 2022-06-27 RX ORDER — OMEPRAZOLE 20 MG/1
20 TABLET, DELAYED RELEASE ORAL
Refills: 0 | Status: DISCONTINUED | COMMUNITY
End: 2022-06-27

## 2022-06-27 RX ORDER — MYCOPHENOLATE MOFETIL 500 MG/1
500 TABLET ORAL
Qty: 180 | Refills: 0 | Status: DISCONTINUED | COMMUNITY
Start: 2021-06-24 | End: 2022-06-27

## 2022-06-27 RX ORDER — SULFAMETHOXAZOLE AND TRIMETHOPRIM 800; 160 MG/1; MG/1
800-160 TABLET ORAL DAILY
Qty: 13 | Refills: 1 | Status: DISCONTINUED | COMMUNITY
Start: 2021-01-15 | End: 2022-06-27

## 2022-06-27 RX ORDER — FAMOTIDINE 20 MG/1
20 TABLET, FILM COATED ORAL
Qty: 180 | Refills: 0 | Status: ACTIVE | COMMUNITY
Start: 2022-06-23

## 2022-06-27 RX ORDER — ROSUVASTATIN CALCIUM 40 MG/1
40 TABLET, FILM COATED ORAL
Qty: 90 | Refills: 0 | Status: ACTIVE | COMMUNITY
Start: 2021-12-14

## 2022-06-27 RX ORDER — FAMOTIDINE 20 MG/1
20 TABLET, FILM COATED ORAL
Refills: 0 | Status: ACTIVE | COMMUNITY

## 2022-06-27 RX ORDER — TACROLIMUS 5 MG/1
5 CAPSULE ORAL
Qty: 90 | Refills: 0 | Status: ACTIVE | COMMUNITY
Start: 2022-03-04

## 2022-06-27 NOTE — ASSESSMENT
[FreeTextEntry1] : 71-year-old woman with worsening renal function in the last year, and multiple potential contributing factors : 1.  NSAIDs , 2.  PPIs? , 3.  Tacrolimus? -Deterioration in GFR began around the same time, 4.  Lupus nephritis? -Doubtful.  I have asked her to minimize or preferably, totally avoid all NSAIDs and use only Tylenol.  I asked her to try the use famotidine and avoid omeprazole or other PPIs.  The question of tacrolimus usage is a stickier problem, particularly since it has benefited her ILD more than other agents such as MMF -and there appeared to be a question of MMF allergy/skin rash at 1 point although it was well-tolerated for a long time.  We would of course like to avoid all radiocontrast exposure if possible, although gadolinium derivatives would still be okay at her GFR currently.  I have ordered renal ultrasound to assess kidney size, echogenicity, and rule out obstructive uropathy.  I have ordered labs to include BMP, cyst statin C, U ACR, urinalysis, C3 and C4 complement, sed rate, PTH.  If she has proteinuria, I would likely consider adding ARB and perhaps reducing amlodipine dosage -edema has been noticed at times, and Doppler was negative for DVT.  The risk of hypocalcemia with Prolia is higher in patients with CKD, so that  will bear watching.  I emphasized to her that while kidney function is somewhat diminished, that she still has a margin of safety that is substantial and our goal is to preserve that in every way possible.  Time spent 55 minutes

## 2022-06-27 NOTE — HISTORY OF PRESENT ILLNESS
[FreeTextEntry1] : 71-year-old woman referred by Lucy Chapa at [facility], because of rising creatinine and falling GFR recently -she has a long history of SLE, with interstitial lung disease.  She has a strong team including Dr. Marycarmen Benitez (pulm), Dr. Misael Moulton (rheumatology), Dr. Alonso Isaac (cardiology).  She has a history of hypertension, currently on amlodipine 5 mg daily, with good control.  Her creatinine was 0.74 a year ago with a GFR of 81, and is now 1.2, with a GFR of 45, BUN 29 (stable).  Her ILD has required treatment with a variety of meds -she has long been on Plaquenil.  In addition, she received high-dose prednisone starting at 60 mg last year and tapering down and off.  She has also been treated with mycophenolate/MMF and since last August, she has been on tacrolimus, currently 5 mg every other day.  She has used NSAIDs intermittently, particularly ibuprofen.  She has taken PPIs, currently omeprazole -but famotidine 20 mg twice daily was just begun last week.  She has severe fatty liver on imaging.  Cardiac work-up has been normal in the past including echo.  I believe she is going to receive Prolia sometime in the near future

## 2022-06-27 NOTE — CONSULT LETTER
[Dear  ___] : Dear  [unfilled], [Consult Letter:] : I had the pleasure of evaluating your patient, [unfilled]. [Please see my note below.] : Please see my note below. [Consult Closing:] : Thank you very much for allowing me to participate in the care of this patient.  If you have any questions, please do not hesitate to contact me. [Sincerely,] : Sincerely, [DrJoaquín  ___] : Dr. JEFFERY [DrJoaquín ___] : Dr. JEFFERY [FreeTextEntry2] : TEE Richter -ACP [FreeTextEntry3] : Sincerely, \par \par Carmelo Rodríguez MD, FACP\par

## 2022-06-29 NOTE — HISTORY OF PRESENT ILLNESS
[de-identified] : 69 y/o F w/ Hx of OP, SLE on HCQ, Raynaud's, HTN, prior chronic steroid use and  ILD here for colon cancer screening. Pt reports her mother was diagnosed with pancreatic cancer at age 81 and brother was diagnosed with colon cancer at age 58. Pt last colonoscopy was 6 years ago by Dr. Finkelstein. Pt was told by Dr. Benitez she needed to get her next c-scope in [facility] due to breathing issues (dx of ILD). 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 Her last u/s was 6/2022 which revealed redemonstration of a 1.7 x 1.6 x 1.1 cm cystic lesion in the pancreatic body. \par \par  Pt reports hx of chronic GERD and was diagnosed with hiatal hernia via endoscopy (does not recall date). Currently taking famotidine 20 mg. Reports intermittent burning sensation and pressure in the sternum. Pt was taking omeprazole but was advised to d/c by Dr. Rodríguez (nephrologist).\par \par Pt has a strong team including Dr. Marycarmen Benitez (pulm), Dr. Misael Moulton (rheumatology), Dr. Alonso Isaac (cardiology).  \par \par Imaging:\par US of abdomen/pelvis 6/10/22: Hepatic steatosis and re demonstration of a 1.7 x 1.6 x 1.1 cm cystic lesion in the pancreatic body

## 2022-06-29 NOTE — ASSESSMENT
[FreeTextEntry1] : Screening colonoscopy\par -Brother diagnosed with colon ca at age 58\par -Will discuss with Dr. Benitez the location of the procedure- [facility] vs [facility]\par -Bowel prep provided\par -r/b/a/i discussed and patient agreeable\par -Details of procedure, including risks of procedure which include but not limited to bleeding, perforation, infection, missed polyp discussed and patient gives verbal consent. Written consent to be obtained at time of procedure.\par -Pt was advised that an escort is needed to  from procedure\par -Will f/u post procedure\par \par Pancreatic cyst\par - Schedule EGD/EUS at [facility] in 1 year\par \par GERD\par - No longer on omeprazole\par -Discussed life style modification such as food diary to track symptoms and identifying triggers, spacing out meals from bedtime, use of a wedge pillow at night\par -Pt encouraged to avoid triggers, spicy foods, caffeine, ETOH, smoking, NSAIDS, chocolate, fried fatty foods\par -Avoid laying down at least 30 minutes post meal\par -Pt advised to keep head of bed elevated and sleep on the L side\par - Continue famotidine 20 mg qhs\par - Gaviscon prn for sx relief\par

## 2022-07-21 ENCOUNTER — APPOINTMENT (OUTPATIENT)
Dept: NEPHROLOGY | Facility: CLINIC | Age: 72
End: 2022-07-21

## 2022-07-21 VITALS
HEART RATE: 88 BPM | SYSTOLIC BLOOD PRESSURE: 106 MMHG | DIASTOLIC BLOOD PRESSURE: 62 MMHG | WEIGHT: 146 LBS | HEIGHT: 65 IN | BODY MASS INDEX: 24.32 KG/M2

## 2022-07-21 DIAGNOSIS — R60.0 LOCALIZED EDEMA: ICD-10-CM

## 2022-07-21 PROCEDURE — 99214 OFFICE O/P EST MOD 30 MIN: CPT

## 2022-07-21 NOTE — CONSULT LETTER
[Dear  ___] : Dear  [unfilled], [Consult Letter:] : I had the pleasure of evaluating your patient, [unfilled]. [Please see my note below.] : Please see my note below. [Consult Closing:] : Thank you very much for allowing me to participate in the care of this patient.  If you have any questions, please do not hesitate to contact me. [Sincerely,] : Sincerely, [FreeTextEntry2] : Lucy Chapa / Dr Sotero Parker - ACP [FreeTextEntry3] : Sincerely, \par \par Carmelo Rodríguez MD, FACP  [DrJoaquín  ___] : Dr. JEFFERY

## 2022-07-21 NOTE — ASSESSMENT
[FreeTextEntry1] : 71-year-old woman with well-controlled hypertension on amlodipine, stable renal function -she asked about tacrolimus, and I explained that while it can affect kidney function, we have used it in kidney transplant patients for decades with good results.  Since it is the only immunosuppressive agent that has truly been effective for her ILD in her, I am reluctant to suggest any reduction or discontinuation.  The dose is relatively low.  We have tried to minimize her exposure to any other potential nephrotoxins such as PPIs or NSAIDs.  I have ordered labs to be done in 3 to 4 months to include BMP, Cystatin C, PTH, phosphorus, 125 vitamin D and 25 vitamin D levels.  She may in the future potentially benefit from the addition of calcitriol.

## 2022-07-21 NOTE — HISTORY OF PRESENT ILLNESS
[FreeTextEntry1] : 71-year-old woman referred by Lucy Chapa at [facility] because of rising creatinine/falling GFR recently.  She has a long history of SLE with severe interstitial lung disease.  She saw Dr. Marycarmen Benitez on Kayla 15 with a BP of 112/80, stable PFTs, and was given Pneumovax 23.  She saw Dr. Stephan Dangelo on June 27, with a BP of 110/80 and her GERD was relatively stable on famotidine and off omeprazole.  I have made that change in the hope of avoiding any potential nephrotoxicity from chronic use of PPIs.  She stopped all use of NSAIDs as I had asked her and only uses Tylenol now.  Her labs now show a creatinine of 1.5, BUN 29, GFR 50 -all of which is stable, but definitely much worse than 15 months ago.  She has received high-dose prednisone but that was tapered off and has been on tacrolimus 5 mg every other day -it is the only immunosuppressive agent that was really effective for her ILD.  She does have severe fatty liver but cardiac work-up by Dr. Alonso Isaac has been okay.  Her PTH is 138 with a calcium of 9.9, and she is on vitamin D3 2000 units daily.  Her sis statin C was 1.65, which yielded a GFR of only 35.  This suggests that her GFR as estimated by serum creatinine may be somewhat falsely high.

## 2022-09-18 ENCOUNTER — LABORATORY RESULT (OUTPATIENT)
Age: 72
End: 2022-09-18

## 2022-09-20 ENCOUNTER — APPOINTMENT (OUTPATIENT)
Age: 72
End: 2022-09-20

## 2022-09-21 ENCOUNTER — APPOINTMENT (OUTPATIENT)
Dept: PULMONOLOGY | Facility: CLINIC | Age: 72
End: 2022-09-21

## 2022-09-21 VITALS
HEART RATE: 84 BPM | DIASTOLIC BLOOD PRESSURE: 85 MMHG | WEIGHT: 146 LBS | HEIGHT: 65 IN | OXYGEN SATURATION: 95 % | TEMPERATURE: 97.4 F | SYSTOLIC BLOOD PRESSURE: 120 MMHG | BODY MASS INDEX: 24.32 KG/M2

## 2022-09-21 PROCEDURE — 94060 EVALUATION OF WHEEZING: CPT

## 2022-09-21 PROCEDURE — 94727 GAS DIL/WSHOT DETER LNG VOL: CPT

## 2022-09-21 PROCEDURE — 99213 OFFICE O/P EST LOW 20 MIN: CPT | Mod: 25

## 2022-09-21 PROCEDURE — 94729 DIFFUSING CAPACITY: CPT

## 2022-09-21 NOTE — PHYSICAL EXAM
[No Acute Distress] : no acute distress [Normal Rate/Rhythm] : normal rate/rhythm [Normal S1, S2] : normal s1, s2 [No Murmurs] : no murmurs [No Clubbing] : no clubbing [No Edema] : no edema [TextBox_68] : rico

## 2022-09-21 NOTE — HISTORY OF PRESENT ILLNESS
[TextBox_4] : 10/29/2020: Asked to evaluate patient by Dr Sotero Parker for dyspnea; however, I see she was evaluated by Dr Winter for this same problem in July and planned for PFT and, if abnormal, CT chest. She has a background of SLE on HCQ, Dr Moulton. Unclear why the PFT was not done. Reports dyspnea since last year on walking or household activities. Not worsening. Sensation of cough w mucus in throat. No history of lung disease, never smoker. Retired . Has seen Dr Isaac from cardiology, had ECG, echo, stress, reports all normal.\par \par 1/15/21: Returned for follow up. No change in her dyspnea. Has had phone follow up w Dr Moulton, remains on HCQ. Recommended trial of steroids for NSIP.\par \par 2/24/21 [Andres]: Here for follow up. PATEL improved after 2 weeks of being on prednisone 60 mg /day. C/o jitteriness, shakes, insomnia , face swelling, weakness and burning sensation in stomach. Taking bactrim but not PPI. wants to know if her son can meet her who was COVID positive about 1 month ago. Has not taken covid vaccine yet. Tapered prednisone to 40mg --> 30mg.\par \par 4/15/21: Has facial puffiness, gained weight. Still having side effects from the prednisone. Feeling overall better as the dose goes down; no worsening of dyspnea as the dose reduced. Had both doses of Pfizer. Son is in [place]. He has recovered from Covid. On Bactrim tiwk and omeprazole. Plan taper to 20mg rest of April, then 10mg in May.\par \par 5/25/21: She is doing very well. Her breathing is nearly back to normal. She's had no worsening as she tapered prednisone to 20mg and then to 10mg. She's gained 10-12 lbs total because of prednisone.\par \par 6/24/21: No problems since seeing me. Down to 5mg w no worsening of symptoms. Weight pretty stable over last month or slightly down. No fevers, no chills, no chest pain. Saw Dr Moulton 2 weeks ago and calcium added to regimen and getting Prolia. Still on HCQ.\par \par 7/7/21: Last visit plan to taper off prednisone and initiate MMF at 500mg bid. Returns today for PFT and check in. Feeling fine. Started MMF without side effects. Had labs Friday, I have not received. 2 more days of prednisone and Bactrim. Notes that she took MMF for her SLE ~7 years ago and tolerated it well.\par \par 7/28/21: Labs 7/2 were fine. Had labs again 2 weeks ago but I do not have. Stared to get a rash on her chest just this week and also both legs swollen and red just  this week. Breathing is fine. Again she did tolerate Cellcept fine in the past.\par \par 8/11/21: We stopped the Cellcept not quite 2 weeks ago. Rash on chest is gone. She thinks legs are better - they look the same to me. Labs were fine, ultrasound was fine. Last echo was 2 years ago w Dr Isaac at [facility]. No orthopnea.\par \par 9/9/21: She reports that her lungs feel much better but she has no energy, very fatigued. This coincided with starting tacrolimus from Dr Moulton on 8/12. She also has a feeling of itchiness inside her body that keeps her from sleeping. Sees Dr Moulton next week. Swelling in her legs disappeared. Lucy Chapa stopped her hctz.\par \par 12/9/21: Is doing ok. Continues on tacro with Dr Moulton. Sees him 12/22. A little dyspneic, fatigued on walking. Got flu shot, pneumonia shot, and Covid booster. Saw Dr Isaac, had echo. No more edema. Didn't have Covid swab.\par \par 3/10/22: She has been ok. She remains on tacro. She saw Dr Isaac and is having a coronary CTA tomorrow at [facility]. Starting Prolia for osteoporosis. Did not have Covid.\par \par 6/15/22: She feels ok. She feels fatigued on walking. She saw Dr Isaac who ruled out cardiac causes of dyspnea. Remains on tacrolimus and needs to see Dr Moulton. We talked about rehab and she might be interested.\par \par 9/21/22: Really feeling pretty good since she saw me last. Remains on tacro, just saw Dr Moulton, all good. Walks every day. Saw Dr Rodríguez from nephrology.

## 2022-09-21 NOTE — ASSESSMENT
[FreeTextEntry1] : Data reviewed:\par \par Echo 10/2021 ACP:\par Normal LV systolic function and mild diastolic dysfunction.\par Normal right side.\par Mild MR. Mild TR. Est RVSP 32 mmHg, normal.\par \par TPMT normal (8/2021)\par \par CT chest LHR 3/2022 prev reviewed : again no sig change in ILD features as noted above: peripheral reticulation, traction, some ground glass\par \par PFT 11/18/20: FVC 1.59L (56%), TLC 2.26L (47%), DLCO 9.32 (45%)\par PFT 2/24/21: FVC 1.81L (64%), FEV1 1.56L (71%), TLC 2.49L (51%), DLCO 10.82 (52%)\par PFT 7/7/21: FVC 1.55L (55%), FEV1 1.39L (64%), TLC 2.31L (48%), DLCO 8.87 (43%)\par Francis 7/28/21: FVC 1.56L (52%), FEV1 1.27L (54%)\par Francis 8/11/21: FVC 1.49L (50%), FEV1 1.35L (58%)\par PFT 9/9/21: FVC 1.64L (59%), FEV1 1.42L (66%), TLC 2.41L (50%), DLCO 8.04 (39%)\par PFT 12/14/21: FVC 1.54L (56%), FEV1 1.34L (64%), TLC 2.08L (44%), DLCO 7.58 (37%)\par PFT 3/10/22: FVC 1.62L (59%), FEV1 1.42L (68%), TLC 2.39L (50%), DLCO 7.63 (38%)\par PFT 6/15/22: FVC 1.67L (61%), FEV1 1.43L (68%), TLC 2.20L (46%), DLCO 7.94 (39%)\par PFT 9/21/22: FVC 1.70L (63%), FEV1 1.49L (72%), TLC 2.27L (48%), DLCO 8.63 (43%)\par \par Impression:\par SLE-ILD, steroids tapered off, didn't tolerate MMF, now on tacrolimus\par Clinically stable\par \par Plan:\par Doing well, PFT is stable.\par Return 3 mos and plan to repeat PFT in 6 mos.\par Lung functions stable over approx 2 years.

## 2022-09-21 NOTE — CONSULT LETTER
[Dear  ___] : Dear  [unfilled], [Courtesy Letter:] : I had the pleasure of seeing your patient, [unfilled], in my office today. [Please see my note below.] : Please see my note below. [Consult Closing:] : Thank you very much for allowing me to participate in the care of this patient.  If you have any questions, please do not hesitate to contact me. [Sincerely,] : Sincerely, [FreeTextEntry2] : Misael Moulton MD\par [address] [place] [address]\[place] [address] [FreeTextEntry3] : Marycarmen Benitez MD, FCCP\par  [___] : [unfilled]

## 2022-10-13 ENCOUNTER — RESULT REVIEW (OUTPATIENT)
Age: 72
End: 2022-10-13

## 2022-10-13 ENCOUNTER — APPOINTMENT (OUTPATIENT)
Age: 72
End: 2022-10-13

## 2022-10-13 PROCEDURE — 45380 COLONOSCOPY AND BIOPSY: CPT | Mod: 33

## 2022-10-26 ENCOUNTER — NON-APPOINTMENT (OUTPATIENT)
Age: 72
End: 2022-10-26

## 2022-11-17 ENCOUNTER — APPOINTMENT (OUTPATIENT)
Dept: NEPHROLOGY | Facility: CLINIC | Age: 72
End: 2022-11-17

## 2022-11-17 VITALS
HEIGHT: 65 IN | BODY MASS INDEX: 24.32 KG/M2 | WEIGHT: 146 LBS | SYSTOLIC BLOOD PRESSURE: 114 MMHG | HEART RATE: 80 BPM | DIASTOLIC BLOOD PRESSURE: 71 MMHG

## 2022-11-17 PROCEDURE — 99214 OFFICE O/P EST MOD 30 MIN: CPT

## 2022-11-17 NOTE — ASSESSMENT
[FreeTextEntry1] : 72-year-old woman with excellent BP, much improved renal function with a GFR currently of 55 and creatinine 1.06.  She is avoiding NSAIDs.  She has received tacrolimus for her lupus related ILD.  I am holding off on calcitriol for now and will repeat labs in 6 months to include BMP, Cystatin C, PTH, phosphorus, vitamin D level.

## 2022-11-17 NOTE — CONSULT LETTER
[Dear  ___] : Dear  [unfilled], [Consult Letter:] : I had the pleasure of evaluating your patient, [unfilled]. [Please see my note below.] : Please see my note below. [Consult Closing:] : Thank you very much for allowing me to participate in the care of this patient.  If you have any questions, please do not hesitate to contact me. [Sincerely,] : Sincerely, [FreeTextEntry2] : Lucy OLIVEIRA - ACP, [place] [FreeTextEntry1] : Michele Ayala -have a great Thanksgiving!    Mati Rodríguez [FreeTextEntry3] : Sincerely, \par \par Carmelo Rodríguez MD, FACP\par

## 2022-11-17 NOTE — HISTORY OF PRESENT ILLNESS
[FreeTextEntry1] : 72-year-old woman referred by Lucy Chapa at [facility], because of an abnormal creatinine and GFR.  She has a long history of SLE, primarily affecting her lungs with ILD.  She saw Dr. Marycarmen Benitez in June.  She has hypertension which is well controlled.  She stopped using NSAIDs at my urging.  Her GERD is stable and she is following with Dr. Stephan Dangelo.  Her renal function has improved, and creatinine is now 1.06 with a BUN of 28, GFR 55, K4.6, hemoglobin 13.6, vitamin D 28, , calcium 10.0, phosphorus 3.4.  She feels generally well at this time.

## 2023-02-01 ENCOUNTER — APPOINTMENT (OUTPATIENT)
Dept: PULMONOLOGY | Facility: CLINIC | Age: 73
End: 2023-02-01
Payer: MEDICARE

## 2023-02-01 VITALS
WEIGHT: 147 LBS | TEMPERATURE: 98.3 F | HEART RATE: 59 BPM | HEIGHT: 66 IN | OXYGEN SATURATION: 98 % | BODY MASS INDEX: 23.63 KG/M2 | SYSTOLIC BLOOD PRESSURE: 118 MMHG | DIASTOLIC BLOOD PRESSURE: 88 MMHG

## 2023-02-01 PROCEDURE — 99213 OFFICE O/P EST LOW 20 MIN: CPT

## 2023-02-01 NOTE — HISTORY OF PRESENT ILLNESS
[TextBox_4] : Under my care since 10/2020 for NSIP in the setting of SLE on HCQ, under care of Dr Moulton. Responded to initial course of steroids in 2021 and did not tolerate MMF maintenance (rash, swelling), but maintained on tacro since late 2021.\par \par 2/1/23: Doing well, still on tacro. Saw Dr Rodríguez. Cardiologist is now Dr Kimberly Reynaga. No interval infections. Would like to travel to [place]. Following w Dr Dangelo.

## 2023-02-01 NOTE — CONSULT LETTER
[Dear  ___] : Dear  [unfilled], [Courtesy Letter:] : I had the pleasure of seeing your patient, [unfilled], in my office today. [Please see my note below.] : Please see my note below. [Consult Closing:] : Thank you very much for allowing me to participate in the care of this patient.  If you have any questions, please do not hesitate to contact me. [Sincerely,] : Sincerely, [FreeTextEntry2] : Misael Moulton MD\par [address] [place] [address]\[place] [address] [FreeTextEntry3] : Marycarmen Benitez MD, FCCP\par  [___] : [unfilled]

## 2023-02-01 NOTE — ASSESSMENT
[FreeTextEntry1] : Data reviewed:\par \par Echo 10/2021 ACP:\par Normal LV systolic function and mild diastolic dysfunction.\par Normal right side.\par Mild MR. Mild TR. Est RVSP 32 mmHg, normal.\par \par TPMT normal (8/2021)\par \par CT chest LHR 3/2022 personally reviewed : again no sig change in ILD features as noted above: peripheral reticulation, traction, some ground glass\par \par PFT 11/18/20: FVC 1.59L (56%), TLC 2.26L (47%), DLCO 9.32 (45%)\par PFT 2/24/21: FVC 1.81L (64%), FEV1 1.56L (71%), TLC 2.49L (51%), DLCO 10.82 (52%)\par PFT 7/7/21: FVC 1.55L (55%), FEV1 1.39L (64%), TLC 2.31L (48%), DLCO 8.87 (43%)\par PFT 9/9/21: FVC 1.64L (59%), FEV1 1.42L (66%), TLC 2.41L (50%), DLCO 8.04 (39%)\par PFT 12/14/21: FVC 1.54L (56%), FEV1 1.34L (64%), TLC 2.08L (44%), DLCO 7.58 (37%)\par PFT 3/10/22: FVC 1.62L (59%), FEV1 1.42L (68%), TLC 2.39L (50%), DLCO 7.63 (38%)\par PFT 6/15/22: FVC 1.67L (61%), FEV1 1.43L (68%), TLC 2.20L (46%), DLCO 7.94 (39%)\par PFT 9/21/22: FVC 1.70L (63%), FEV1 1.49L (72%), TLC 2.27L (48%), DLCO 8.63 (43%)\par \par Impression:\par SLE-ILD, steroids tapered off, didn't tolerate MMF, now on tacrolimus\par Clinically stable\par \par Plan:\par Doing well, PFT has been stable, CT has been stable.\par Return 3 mos w PFT at that time.\par Then consider follow up CT based on scan and symptoms.

## 2023-05-03 ENCOUNTER — APPOINTMENT (OUTPATIENT)
Dept: PULMONOLOGY | Facility: CLINIC | Age: 73
End: 2023-05-03
Payer: MEDICARE

## 2023-05-03 VITALS
DIASTOLIC BLOOD PRESSURE: 78 MMHG | SYSTOLIC BLOOD PRESSURE: 120 MMHG | TEMPERATURE: 97.7 F | OXYGEN SATURATION: 97 % | HEART RATE: 95 BPM | BODY MASS INDEX: 23.63 KG/M2 | WEIGHT: 147 LBS | HEIGHT: 66 IN

## 2023-05-03 PROCEDURE — 94727 GAS DIL/WSHOT DETER LNG VOL: CPT

## 2023-05-03 PROCEDURE — 94060 EVALUATION OF WHEEZING: CPT

## 2023-05-03 PROCEDURE — 94729 DIFFUSING CAPACITY: CPT

## 2023-05-03 PROCEDURE — 99213 OFFICE O/P EST LOW 20 MIN: CPT | Mod: 25

## 2023-05-03 NOTE — HISTORY OF PRESENT ILLNESS
[TextBox_4] : Under my care since 10/2020 for NSIP in the setting of SLE on HCQ, under care of Dr Moulton. Responded to initial course of steroids in 2021 and did not tolerate MMF maintenance (rash, swelling), but maintained on tacro since late 2021.\par \par 2/1/23: Doing well, still on tacro. Saw Dr Rodríguez. Cardiologist is now Dr Kimberly Reynaga. No interval infections. Would like to travel to [place]. Following w Dr Dangelo.\par \par 5/3/2023: Has been well since seeing me last. No interval illnesses. Going to [place] end of month w her son. Still on tacro. Seeing endo tomorrow.

## 2023-05-03 NOTE — CONSULT LETTER
[Dear  ___] : Dear  [unfilled], [Courtesy Letter:] : I had the pleasure of seeing your patient, [unfilled], in my office today. [Please see my note below.] : Please see my note below. [Consult Closing:] : Thank you very much for allowing me to participate in the care of this patient.  If you have any questions, please do not hesitate to contact me. [Sincerely,] : Sincerely, [FreeTextEntry2] : Miasel Moulton MD\par [address] [place] [address]\[place] [address] [FreeTextEntry3] : Marycarmen Benitez MD, FCCP\par  [___] : [unfilled]

## 2023-05-03 NOTE — ASSESSMENT
[FreeTextEntry1] : Data reviewed:\par \par Echo 10/2021 ACP:\par Normal LV systolic function and mild diastolic dysfunction.\par Normal right side.\par Mild MR. Mild TR. Est RVSP 32 mmHg, normal.\par \par TPMT normal (8/2021)\par \par CT chest LHR 3/2022: no sig change in ILD features as noted above: peripheral reticulation, traction, some ground glass\par \par PFT 11/18/20: FVC 1.59L (56%), TLC 2.26L (47%), DLCO 9.32 (45%)\par PFT 2/24/21: FVC 1.81L (64%), FEV1 1.56L (71%), TLC 2.49L (51%), DLCO 10.82 (52%)\par PFT 7/7/21: FVC 1.55L (55%), FEV1 1.39L (64%), TLC 2.31L (48%), DLCO 8.87 (43%)\par PFT 9/9/21: FVC 1.64L (59%), FEV1 1.42L (66%), TLC 2.41L (50%), DLCO 8.04 (39%)\par PFT 12/14/21: FVC 1.54L (56%), FEV1 1.34L (64%), TLC 2.08L (44%), DLCO 7.58 (37%)\par PFT 3/10/22: FVC 1.62L (59%), FEV1 1.42L (68%), TLC 2.39L (50%), DLCO 7.63 (38%)\par PFT 6/15/22: FVC 1.67L (61%), FEV1 1.43L (68%), TLC 2.20L (46%), DLCO 7.94 (39%)\par PFT 9/21/22: FVC 1.70L (63%), FEV1 1.49L (72%), TLC 2.27L (48%), DLCO 8.63 (43%)\par PFT 5/3/23: FVC 1.78L (66%), FEV1 1.47L (71%), TLC 2.42L (51%), DLCO 8.59 (43%)\par \par Impression:\par SLE-ILD, steroids tapered off, didn't tolerate MMF, now on tacrolimus\par Clinically stable\par \par Plan:\par Doing great.\par Defer CT at this time.\par Consider Covid booster again for >65 margot given upcoming trip.\par See me 6 mos w PFT.

## 2023-05-18 ENCOUNTER — APPOINTMENT (OUTPATIENT)
Dept: NEPHROLOGY | Facility: CLINIC | Age: 73
End: 2023-05-18
Payer: MEDICARE

## 2023-05-18 VITALS
SYSTOLIC BLOOD PRESSURE: 108 MMHG | BODY MASS INDEX: 23.63 KG/M2 | HEART RATE: 84 BPM | DIASTOLIC BLOOD PRESSURE: 69 MMHG | WEIGHT: 147 LBS | HEIGHT: 66 IN

## 2023-05-18 PROCEDURE — 99214 OFFICE O/P EST MOD 30 MIN: CPT

## 2023-05-18 NOTE — HISTORY OF PRESENT ILLNESS
[FreeTextEntry1] : 72-year-old woman referred by Lucy Chapa at [facility], because of an abnormal creatinine and GFR.  She has a long history of SLE, primarily affecting her lungs with ILD.  She saw Dr. Benitez on May 3, with a BP of 120/78 and remains on tacrolimus and hydroxychloroquine.  Her rheumatologist is Dr. Victor.  Her endocrinologist is Dr. Morgan.  Her renal function is stable, with a creatinine this month of 0.96-1.0, GFR of 59-62 by creatinine, but only 43 by Cystatin C.  Her K is 4.5, A1c 5.7, hemoglobin 13.6.  She feels relatively well.  She will be going on a 10-day trip to [place] leaving next week.

## 2023-05-18 NOTE — CONSULT LETTER
[Dear  ___] : Dear  [unfilled], [Consult Letter:] : I had the pleasure of evaluating your patient, [unfilled]. [Please see my note below.] : Please see my note below. [Consult Closing:] : Thank you very much for allowing me to participate in the care of this patient.  If you have any questions, please do not hesitate to contact me. [Sincerely,] : Sincerely, [FreeTextEntry2] : Lucy Chapa at [facility] in [place] [FreeTextEntry3] : Sincerely, \par \par Carmelo Rodríguez MD, FACP\par  [DrJoaquín  ___] : Dr. JEFFERY [DrJoaquín ___] : Dr. JEFFERY

## 2023-05-18 NOTE — ASSESSMENT
[FreeTextEntry1] : 72-year-old woman with SLE affecting primarily her lungs, on tacrolimus and hydroxychloroquine.  Renal function is stable.  Hypertension is well controlled on amlodipine 2.5 mg daily.  She does have an elevated PTH but I am reluctant to start calcitriol with her calcium at 10.1.  We will continue to monitor that.  She will follow-up with each of her physicians.  She will return here in 6 months, preceded by BMP, Cystatin C, PTH, and phosphorus.

## 2023-06-27 ENCOUNTER — APPOINTMENT (OUTPATIENT)
Age: 73
End: 2023-06-27
Payer: MEDICARE

## 2023-06-27 VITALS
OXYGEN SATURATION: 97 % | TEMPERATURE: 96.3 F | WEIGHT: 148 LBS | RESPIRATION RATE: 14 BRPM | BODY MASS INDEX: 23.78 KG/M2 | SYSTOLIC BLOOD PRESSURE: 118 MMHG | HEART RATE: 86 BPM | HEIGHT: 66 IN | DIASTOLIC BLOOD PRESSURE: 60 MMHG

## 2023-06-27 DIAGNOSIS — K86.2 CYST OF PANCREAS: ICD-10-CM

## 2023-06-27 DIAGNOSIS — Z12.11 ENCOUNTER FOR SCREENING FOR MALIGNANT NEOPLASM OF COLON: ICD-10-CM

## 2023-06-27 PROCEDURE — 99214 OFFICE O/P EST MOD 30 MIN: CPT

## 2023-06-28 PROBLEM — Z12.11 COLON CANCER SCREENING: Status: ACTIVE | Noted: 2022-06-27

## 2023-06-28 PROBLEM — K86.2 PANCREATIC CYST: Status: ACTIVE | Noted: 2022-06-27

## 2023-06-29 NOTE — ASSESSMENT
[FreeTextEntry1] : 71 y/o F w/ Hx of OP, SLE on HCQ, Raynaud's, HTN, prior chronic steroid use and  ILD here for follow up appt to schedule EGD\par \par Pancreatic cyst\par - Schedule EGD/EUS at [facility], r/a/i/b discussed and patient agreeable \par - pt refuses MRI due to claustrophobia \par - will also evaluate at this time if any significance of epigastric fullness, can continue Pepcid as needed \par \par CRC screening \par - recommend 5 years but up to patient since will be over 73 at that time \par \par f/u after EGD

## 2023-06-29 NOTE — HISTORY OF PRESENT ILLNESS
[de-identified] : 73 y/o F w/ Hx of OP, SLE on HCQ, Raynaud's, HTN, prior chronic steroid use and  ILD here for follow up appt to schedule EGD\par \par 6/27/23\par - denies epigastric pain but feels fullness in middle of stomach/something stuck\par - denies lower GI complaints \par - Pepcid helps \par - last cscope October 2022 - 3 mm polyp \par - last endoscopy 2018 small hiatal hernia, gastritis\par \par Previous hx: \par Pt reports her mother was diagnosed with pancreatic cancer at age 81 and brother was diagnosed with colon cancer at age 58. Pt last colonoscopy was 6 years ago by Dr. Finkelstein. Pt was told by Dr. Benitez she needed to get her next c-scope in [facility] due to breathing issues (dx of ILD). 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 Her last u/s was 6/2022 which revealed redemonstration of a 1.7 x 1.6 x 1.1 cm cystic lesion in the pancreatic body. \par \par  Pt reports hx of chronic GERD and was diagnosed with hiatal hernia via endoscopy (does not recall date). Currently taking famotidine 20 mg. Reports intermittent burning sensation and pressure in the sternum. Pt was taking omeprazole but was advised to d/c by Dr. Rodríguez (nephrologist).\par \par Pt has a strong team including Dr. Marycarmen Benitez (pulm), Dr. Misael Moulton (rheumatology), Dr. Alonso Isaac (cardiology).  \par \par Imaging:\par US of abdomen/pelvis 6/10/22: Hepatic steatosis and re demonstration of a 1.7 x 1.6 x 1.1 cm cystic lesion in the pancreatic body

## 2023-06-30 ENCOUNTER — NON-APPOINTMENT (OUTPATIENT)
Age: 73
End: 2023-06-30

## 2023-07-06 ENCOUNTER — APPOINTMENT (OUTPATIENT)
Age: 73
End: 2023-07-06
Payer: MEDICARE

## 2023-07-06 ENCOUNTER — RESULT REVIEW (OUTPATIENT)
Age: 73
End: 2023-07-06

## 2023-07-06 PROCEDURE — 43239 EGD BIOPSY SINGLE/MULTIPLE: CPT

## 2023-07-06 PROCEDURE — 43237 ENDOSCOPIC US EXAM ESOPH: CPT

## 2023-07-20 ENCOUNTER — APPOINTMENT (OUTPATIENT)
Age: 73
End: 2023-07-20

## 2023-07-26 DIAGNOSIS — A04.8 OTHER SPECIFIED BACTERIAL INTESTINAL INFECTIONS: ICD-10-CM

## 2023-07-26 RX ORDER — OMEPRAZOLE MAGNESIUM, AMOXICILLIN AND RIFABUTIN 10; 250; 12.5 MG/1; MG/1; MG/1
250-12.5-1 CAPSULE, DELAYED RELEASE ORAL 3 TIMES DAILY
Qty: 168 | Refills: 0 | Status: ACTIVE | COMMUNITY
Start: 2023-07-26 | End: 1900-01-01

## 2023-09-11 ENCOUNTER — APPOINTMENT (OUTPATIENT)
Dept: PULMONOLOGY | Facility: CLINIC | Age: 73
End: 2023-09-11
Payer: MEDICARE

## 2023-09-11 VITALS
OXYGEN SATURATION: 97 % | BODY MASS INDEX: 22.66 KG/M2 | DIASTOLIC BLOOD PRESSURE: 70 MMHG | SYSTOLIC BLOOD PRESSURE: 106 MMHG | TEMPERATURE: 97.7 F | HEART RATE: 84 BPM | HEIGHT: 66 IN | WEIGHT: 141 LBS

## 2023-09-11 PROCEDURE — G0008: CPT

## 2023-09-11 PROCEDURE — 99213 OFFICE O/P EST LOW 20 MIN: CPT | Mod: 25

## 2023-09-11 PROCEDURE — 90662 IIV NO PRSV INCREASED AG IM: CPT

## 2023-09-13 ENCOUNTER — NON-APPOINTMENT (OUTPATIENT)
Age: 73
End: 2023-09-13

## 2023-09-13 LAB — UREA BREATH TEST QL: NEGATIVE

## 2023-11-08 ENCOUNTER — APPOINTMENT (OUTPATIENT)
Dept: PULMONOLOGY | Facility: CLINIC | Age: 73
End: 2023-11-08
Payer: MEDICARE

## 2023-11-08 VITALS
DIASTOLIC BLOOD PRESSURE: 70 MMHG | OXYGEN SATURATION: 97 % | HEART RATE: 76 BPM | WEIGHT: 146 LBS | HEIGHT: 66 IN | TEMPERATURE: 97.5 F | BODY MASS INDEX: 23.46 KG/M2 | SYSTOLIC BLOOD PRESSURE: 112 MMHG

## 2023-11-08 PROCEDURE — 99213 OFFICE O/P EST LOW 20 MIN: CPT | Mod: 25

## 2023-11-08 PROCEDURE — 94060 EVALUATION OF WHEEZING: CPT

## 2023-11-08 PROCEDURE — 94729 DIFFUSING CAPACITY: CPT

## 2023-11-15 ENCOUNTER — APPOINTMENT (OUTPATIENT)
Dept: NEPHROLOGY | Facility: CLINIC | Age: 73
End: 2023-11-15
Payer: MEDICARE

## 2023-11-15 VITALS
SYSTOLIC BLOOD PRESSURE: 106 MMHG | BODY MASS INDEX: 23.46 KG/M2 | DIASTOLIC BLOOD PRESSURE: 62 MMHG | WEIGHT: 146 LBS | HEIGHT: 66 IN

## 2023-11-15 DIAGNOSIS — R60.0 LOCALIZED EDEMA: ICD-10-CM

## 2023-11-15 PROCEDURE — 99214 OFFICE O/P EST MOD 30 MIN: CPT

## 2024-04-05 ENCOUNTER — APPOINTMENT (OUTPATIENT)
Age: 74
End: 2024-04-05
Payer: MEDICARE

## 2024-04-05 VITALS
SYSTOLIC BLOOD PRESSURE: 120 MMHG | DIASTOLIC BLOOD PRESSURE: 65 MMHG | WEIGHT: 148 LBS | OXYGEN SATURATION: 96 % | HEART RATE: 90 BPM | RESPIRATION RATE: 16 BRPM | HEIGHT: 65 IN | BODY MASS INDEX: 24.66 KG/M2 | TEMPERATURE: 94.8 F

## 2024-04-05 DIAGNOSIS — R13.19 OTHER DYSPHAGIA: ICD-10-CM

## 2024-04-05 DIAGNOSIS — K21.9 GASTRO-ESOPHAGEAL REFLUX DISEASE W/OUT ESOPHAGITIS: ICD-10-CM

## 2024-04-05 PROCEDURE — 99214 OFFICE O/P EST MOD 30 MIN: CPT

## 2024-04-05 PROCEDURE — G2211 COMPLEX E/M VISIT ADD ON: CPT

## 2024-04-10 ENCOUNTER — NON-APPOINTMENT (OUTPATIENT)
Age: 74
End: 2024-04-10

## 2024-05-08 ENCOUNTER — APPOINTMENT (OUTPATIENT)
Dept: PULMONOLOGY | Facility: CLINIC | Age: 74
End: 2024-05-08
Payer: MEDICARE

## 2024-05-08 VITALS
DIASTOLIC BLOOD PRESSURE: 82 MMHG | BODY MASS INDEX: 24.66 KG/M2 | HEART RATE: 85 BPM | HEIGHT: 65 IN | TEMPERATURE: 97.2 F | WEIGHT: 148 LBS | SYSTOLIC BLOOD PRESSURE: 118 MMHG | OXYGEN SATURATION: 97 %

## 2024-05-08 DIAGNOSIS — J84.9 INTERSTITIAL PULMONARY DISEASE, UNSPECIFIED: ICD-10-CM

## 2024-05-08 PROCEDURE — 94618 PULMONARY STRESS TESTING: CPT

## 2024-05-08 PROCEDURE — 94060 EVALUATION OF WHEEZING: CPT

## 2024-05-08 PROCEDURE — 99213 OFFICE O/P EST LOW 20 MIN: CPT | Mod: 25

## 2024-05-08 PROCEDURE — 94729 DIFFUSING CAPACITY: CPT

## 2024-05-08 PROCEDURE — 94727 GAS DIL/WSHOT DETER LNG VOL: CPT

## 2024-05-08 NOTE — CONSULT LETTER
[Dear  ___] : Dear  [unfilled], [Courtesy Letter:] : I had the pleasure of seeing your patient, [unfilled], in my office today. [Please see my note below.] : Please see my note below. [Consult Closing:] : Thank you very much for allowing me to participate in the care of this patient.  If you have any questions, please do not hesitate to contact me. [Sincerely,] : Sincerely, [FreeTextEntry2] : Misael Moulton MD\par  [address] [place] [address]\[place] [address] [FreeTextEntry3] : Marycarmen Benitez MD, FCCP\par   [___] : [unfilled]

## 2024-05-08 NOTE — ASSESSMENT
[FreeTextEntry1] : Data reviewed:  Echo 10/2021 ACP: Normal LV systolic function and mild diastolic dysfunction. Normal right side. Mild MR. Mild TR. Est RVSP 32 mmHg, normal.  TPMT normal (8/2021)  CT chest [facility] 11/2023 again personally reviewed : just mild progression over time  PFT 11/18/20: FVC 1.59L (56%), TLC 2.26L (47%), DLCO 9.32 (45%) PFT 2/24/21: FVC 1.81L (64%), FEV1 1.56L (71%), TLC 2.49L (51%), DLCO 10.82 (52%) PFT 7/7/21: FVC 1.55L (55%), FEV1 1.39L (64%), TLC 2.31L (48%), DLCO 8.87 (43%) PFT 9/9/21: FVC 1.64L (59%), FEV1 1.42L (66%), TLC 2.41L (50%), DLCO 8.04 (39%) PFT 12/14/21: FVC 1.54L (56%), FEV1 1.34L (64%), TLC 2.08L (44%), DLCO 7.58 (37%) PFT 3/10/22: FVC 1.62L (59%), FEV1 1.42L (68%), TLC 2.39L (50%), DLCO 7.63 (38%) PFT 6/15/22: FVC 1.67L (61%), FEV1 1.43L (68%), TLC 2.20L (46%), DLCO 7.94 (39%) PFT 9/21/22: FVC 1.70L (63%), FEV1 1.49L (72%), TLC 2.27L (48%), DLCO 8.63 (43%) PFT 5/3/23: FVC 1.78L (66%), FEV1 1.47L (71%), TLC 2.42L (51%), DLCO 8.59 (43%) PFT 11/8/2023: FVC 1.58L (59%), FEV1 1.39L (68%), TLC couldn't do, DLCO 8.17 (41%) PFT 5/8/2024: FVC 1.87L (70%), FEV1 1.43L (70%), TLC 2.27L (48%), DLCO 8.10 (41%) / 384m 95-->92%  Impression: SLE-ILD, steroids tapered off, didn't tolerate MMF, now on tacrolimus Slight decrease in PFT parameters w slight increase dyspnea  Plan: She really is relatively stable clinically, by lung function, and by imaging. We discussed the pros and cons of initiating antifibrotics and in shared decision making agreed to continue with observation on tacro. Will see her back 6 mos w PFT. Referral to [facility] pulm rehab.

## 2024-05-08 NOTE — HISTORY OF PRESENT ILLNESS
[TextBox_4] : Under my care since 10/2020 for NSIP in the setting of SLE on HCQ, under care of Dr Moulton. Responded to initial course of steroids in 2021 and did not tolerate MMF maintenance (rash, swelling), but maintained on tacro since late 2021.  2/1/23: Doing well, still on tacro. Saw Dr Rodríguez. Cardiologist is now Dr Kimberly Reynaga. No interval infections. Would like to travel to [place]. Following w Dr Dangelo.  5/3/2023: Has been well since seeing me last. No interval illnesses. Going to [place] end of month w her son. Still on tacro. Seeing endo tomorrow.  9/11/2023: Had Covid in June, mild illness, Paxlovid, recovered. Endoscopy pos for H pylori, told to stop tacrolimus for two weeks for the Abx treatment and did so, and now back on tacrolimus. Breathing has been fine, stable. Trip to [place] was fantastic.  11/8/2023: Feels just a little chest tightness on household activity, not severe, but a small change. Has recovered from Covid. No intercurrent infections. Continues on tacro.  5/8/2024: Feels well, no changes in breathing, no new problems. Did see Dr Dangelo, for esophagram. Saw Dr Moulton, all fine.

## 2024-05-09 ENCOUNTER — APPOINTMENT (OUTPATIENT)
Dept: RADIOLOGY | Facility: HOSPITAL | Age: 74
End: 2024-05-09
Payer: MEDICARE

## 2024-05-09 ENCOUNTER — OUTPATIENT (OUTPATIENT)
Dept: OUTPATIENT SERVICES | Facility: HOSPITAL | Age: 74
LOS: 1 days | End: 2024-05-09
Payer: MEDICARE

## 2024-05-09 PROCEDURE — 74220 X-RAY XM ESOPHAGUS 1CNTRST: CPT

## 2024-05-09 PROCEDURE — 74220 X-RAY XM ESOPHAGUS 1CNTRST: CPT | Mod: 26

## 2024-05-15 ENCOUNTER — APPOINTMENT (OUTPATIENT)
Dept: NEPHROLOGY | Facility: CLINIC | Age: 74
End: 2024-05-15
Payer: MEDICARE

## 2024-05-15 VITALS
BODY MASS INDEX: 24.66 KG/M2 | SYSTOLIC BLOOD PRESSURE: 122 MMHG | HEIGHT: 65 IN | WEIGHT: 148 LBS | DIASTOLIC BLOOD PRESSURE: 74 MMHG

## 2024-05-15 DIAGNOSIS — K76.0 FATTY (CHANGE OF) LIVER, NOT ELSEWHERE CLASSIFIED: ICD-10-CM

## 2024-05-15 DIAGNOSIS — N18.30 CHRONIC KIDNEY DISEASE, STAGE 3 UNSPECIFIED: ICD-10-CM

## 2024-05-15 DIAGNOSIS — N25.81 SECONDARY HYPERPARATHYROIDISM OF RENAL ORIGIN: ICD-10-CM

## 2024-05-15 DIAGNOSIS — I10 ESSENTIAL (PRIMARY) HYPERTENSION: ICD-10-CM

## 2024-05-15 DIAGNOSIS — M32.9 SYSTEMIC LUPUS ERYTHEMATOSUS, UNSPECIFIED: ICD-10-CM

## 2024-05-15 PROCEDURE — 99214 OFFICE O/P EST MOD 30 MIN: CPT

## 2024-05-15 PROCEDURE — G2211 COMPLEX E/M VISIT ADD ON: CPT

## 2024-05-15 NOTE — CONSULT LETTER
[Dear  ___] : Dear  [unfilled], [Consult Letter:] : I had the pleasure of evaluating your patient, [unfilled]. [Please see my note below.] : Please see my note below. [Consult Closing:] : Thank you very much for allowing me to participate in the care of this patient.  If you have any questions, please do not hesitate to contact me. [Sincerely,] : Sincerely, [FreeTextEntry2] : Lucy Chapa, NP

## 2024-05-15 NOTE — ASSESSMENT
[FreeTextEntry1] : 73-year-old woman with well-controlled hypertension on amlodipine, stable renal function, stable pulmonary function, and currently having dysphagia for solids investigated with no major findings on esophagram.  She will return in 6 months preceded by labs to include PTH, BMP, Cystatin C, UACR

## 2024-05-15 NOTE — HISTORY OF PRESENT ILLNESS
[FreeTextEntry1] : 73-year-old woman referred by Lucy Chapa because of an abnormal creatinine and GFR.  She has a long history of SLE, complicated by ILD.  She saw Dr. Benitez last week, and BP was 118/82 with stable pulmonary function.  She saw Lucy in early April with a BP of 130/69.  She visited her cardiologist, Dr. Kimberly Reynaga on April 18, and BP was 128/77.  Her renal function was stable with a creatinine of 1.18, BUN 31, GFR 48, K4.2, CO2 25, hemoglobin 13.0, A1c 5.8, triglycerides 400, and HDL 38.  She feels generally well with no new complaints.  She saw Dr. Dangelo in GI consultation on April 5 regarding a sticky sensation in her esophagus when swallowing solids.  She has a history of gastritis and grade 4 hiatal hernia.  She underwent an esophagram and is awaiting discussion with Dr. Dangelo about the results.  It appears that the impression was esophageal dysmotility.  No reflux or stricture was noted.

## 2024-05-24 ENCOUNTER — NON-APPOINTMENT (OUTPATIENT)
Age: 74
End: 2024-05-24

## 2024-07-18 ENCOUNTER — OUTPATIENT (OUTPATIENT)
Dept: OUTPATIENT SERVICES | Facility: HOSPITAL | Age: 74
LOS: 1 days | Discharge: HOME | End: 2024-07-18

## 2024-07-18 ENCOUNTER — APPOINTMENT (OUTPATIENT)
Dept: GASTROENTEROLOGY | Facility: HOSPITAL | Age: 74
End: 2024-07-18

## 2024-07-18 PROCEDURE — 91010 ESOPHAGUS MOTILITY STUDY: CPT

## 2024-07-29 ENCOUNTER — NON-APPOINTMENT (OUTPATIENT)
Age: 74
End: 2024-07-29

## 2024-07-30 ENCOUNTER — APPOINTMENT (OUTPATIENT)
Dept: GASTROENTEROLOGY | Facility: CLINIC | Age: 74
End: 2024-07-30
Payer: MEDICARE

## 2024-07-30 VITALS
HEIGHT: 64 IN | DIASTOLIC BLOOD PRESSURE: 76 MMHG | BODY MASS INDEX: 25.61 KG/M2 | SYSTOLIC BLOOD PRESSURE: 128 MMHG | OXYGEN SATURATION: 96 % | WEIGHT: 150 LBS | TEMPERATURE: 97.4 F | HEART RATE: 79 BPM | RESPIRATION RATE: 17 BRPM

## 2024-07-30 DIAGNOSIS — R13.10 DYSPHAGIA, UNSPECIFIED: ICD-10-CM

## 2024-07-30 DIAGNOSIS — K21.9 GASTRO-ESOPHAGEAL REFLUX DISEASE W/OUT ESOPHAGITIS: ICD-10-CM

## 2024-07-30 PROCEDURE — 99214 OFFICE O/P EST MOD 30 MIN: CPT

## 2024-07-30 RX ORDER — OMEPRAZOLE 20 MG/1
20 CAPSULE, DELAYED RELEASE ORAL
Qty: 30 | Refills: 0 | Status: ACTIVE | COMMUNITY
Start: 2024-07-30 | End: 1900-01-01

## 2024-07-31 NOTE — HISTORY OF PRESENT ILLNESS
[FreeTextEntry1] : 73F with PMH of OP, SLE (on HCQ), Raynaud's, HTN, prior chronic steroid use and ILD, presenting for follow-up after undergoing esophagram and HRM recently.   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  HRM study by Aliya Tubbs (07/18/2024):  - inconclusive for EGJ out-flow obstruction, possibly due to small hiatal hernia - multimodal imaging was recommended   Patient states that overall she has been in good health. States that she is still having issues with swallowing. She notices it most when taking pill medications, feels as if the pills get stuck and has to drink a lot of water for them to pass down the esophagus. Also notices it with solid foods, like bread. Does not really appreciate any swallowing difficulty with liquids at this time. Describes that symptoms are roughly 8/10, but she does not want to continue pursuing more work-up for this issue at this time.   Current meds:  - amlodipine 5 mg PO daily  -  mg PO daily - rosuvastatin 40 mg PO daily  - tacrolimus 5 mg PO daily  - famotidine 20 mg PO daily

## 2024-07-31 NOTE — ASSESSMENT
[FreeTextEntry1] : 72F with PMH of OP, SLE (on HCQ), Raynaud's, HTN, prior chronic steroid use and ILD, presenting for follow-up after undergoing esophagram and HRM recently.  Underwent barium esophagram, which showed evidence of dysmotility.  HRM study by Aliya Tubbs (07/18/2024):  - inconclusive for EGJ out-flow obstruction, possibly due to small hiatal hernia - multimodal imaging was recommended   Plan:  - will start trial of omeprazole 20 mg PO daily x 1 month - reviewed CT chest from 2023, which commented on a small hiatal hernia  - telemedicine visit in 1 month to re-assess symptoms

## 2024-08-26 ENCOUNTER — APPOINTMENT (OUTPATIENT)
Dept: GASTROENTEROLOGY | Facility: CLINIC | Age: 74
End: 2024-08-26
Payer: MEDICARE

## 2024-08-26 PROCEDURE — 99442: CPT

## 2024-08-26 PROCEDURE — G2211 COMPLEX E/M VISIT ADD ON: CPT | Mod: NC

## 2024-10-14 ENCOUNTER — APPOINTMENT (OUTPATIENT)
Dept: NEPHROLOGY | Facility: CLINIC | Age: 74
End: 2024-10-14
Payer: MEDICARE

## 2024-10-14 VITALS — BODY MASS INDEX: 25.61 KG/M2 | WEIGHT: 150 LBS | HEIGHT: 64 IN

## 2024-10-14 DIAGNOSIS — N18.30 CHRONIC KIDNEY DISEASE, STAGE 3 UNSPECIFIED: ICD-10-CM

## 2024-10-14 DIAGNOSIS — M32.9 SYSTEMIC LUPUS ERYTHEMATOSUS, UNSPECIFIED: ICD-10-CM

## 2024-10-14 DIAGNOSIS — N25.81 SECONDARY HYPERPARATHYROIDISM OF RENAL ORIGIN: ICD-10-CM

## 2024-10-14 DIAGNOSIS — I10 ESSENTIAL (PRIMARY) HYPERTENSION: ICD-10-CM

## 2024-10-14 PROCEDURE — 99442: CPT | Mod: 93

## 2024-10-14 RX ORDER — CALCITRIOL 0.25 UG/1
0.25 CAPSULE, LIQUID FILLED ORAL
Qty: 90 | Refills: 1 | Status: ACTIVE | COMMUNITY
Start: 2024-10-14 | End: 1900-01-01

## 2024-12-03 ENCOUNTER — APPOINTMENT (OUTPATIENT)
Dept: GASTROENTEROLOGY | Facility: CLINIC | Age: 74
End: 2024-12-03
Payer: MEDICARE

## 2024-12-03 VITALS
DIASTOLIC BLOOD PRESSURE: 74 MMHG | HEART RATE: 103 BPM | RESPIRATION RATE: 18 BRPM | HEIGHT: 64 IN | BODY MASS INDEX: 25.27 KG/M2 | TEMPERATURE: 96.3 F | SYSTOLIC BLOOD PRESSURE: 122 MMHG | WEIGHT: 148 LBS

## 2024-12-03 DIAGNOSIS — K21.9 GASTRO-ESOPHAGEAL REFLUX DISEASE W/OUT ESOPHAGITIS: ICD-10-CM

## 2024-12-03 DIAGNOSIS — Z12.11 ENCOUNTER FOR SCREENING FOR MALIGNANT NEOPLASM OF COLON: ICD-10-CM

## 2024-12-03 DIAGNOSIS — K86.2 CYST OF PANCREAS: ICD-10-CM

## 2024-12-03 PROCEDURE — 99214 OFFICE O/P EST MOD 30 MIN: CPT

## 2024-12-04 ENCOUNTER — APPOINTMENT (OUTPATIENT)
Dept: PULMONOLOGY | Facility: CLINIC | Age: 74
End: 2024-12-04
Payer: MEDICARE

## 2024-12-04 VITALS
HEIGHT: 64 IN | DIASTOLIC BLOOD PRESSURE: 80 MMHG | HEART RATE: 85 BPM | TEMPERATURE: 98.1 F | OXYGEN SATURATION: 94 % | SYSTOLIC BLOOD PRESSURE: 110 MMHG | WEIGHT: 148 LBS | BODY MASS INDEX: 25.27 KG/M2

## 2024-12-04 PROCEDURE — 94727 GAS DIL/WSHOT DETER LNG VOL: CPT

## 2024-12-04 PROCEDURE — 94729 DIFFUSING CAPACITY: CPT

## 2024-12-04 PROCEDURE — 99213 OFFICE O/P EST LOW 20 MIN: CPT | Mod: 25

## 2024-12-04 PROCEDURE — 94010 BREATHING CAPACITY TEST: CPT

## 2024-12-06 ENCOUNTER — NON-APPOINTMENT (OUTPATIENT)
Age: 74
End: 2024-12-06

## 2024-12-18 ENCOUNTER — APPOINTMENT (OUTPATIENT)
Dept: NEPHROLOGY | Facility: CLINIC | Age: 74
End: 2024-12-18
Payer: MEDICARE

## 2024-12-18 VITALS
WEIGHT: 148 LBS | SYSTOLIC BLOOD PRESSURE: 116 MMHG | DIASTOLIC BLOOD PRESSURE: 68 MMHG | HEIGHT: 64 IN | BODY MASS INDEX: 25.27 KG/M2

## 2024-12-18 DIAGNOSIS — N25.81 SECONDARY HYPERPARATHYROIDISM OF RENAL ORIGIN: ICD-10-CM

## 2024-12-18 DIAGNOSIS — N18.30 CHRONIC KIDNEY DISEASE, STAGE 3 UNSPECIFIED: ICD-10-CM

## 2024-12-18 DIAGNOSIS — I10 ESSENTIAL (PRIMARY) HYPERTENSION: ICD-10-CM

## 2024-12-18 PROCEDURE — 99215 OFFICE O/P EST HI 40 MIN: CPT

## 2024-12-25 PROBLEM — F10.90 ALCOHOL USE: Status: INACTIVE | Noted: 2020-07-01

## 2025-01-03 ENCOUNTER — OUTPATIENT (OUTPATIENT)
Dept: OUTPATIENT SERVICES | Facility: HOSPITAL | Age: 75
LOS: 1 days | End: 2025-01-03
Payer: MEDICARE

## 2025-01-03 ENCOUNTER — APPOINTMENT (OUTPATIENT)
Dept: CT IMAGING | Facility: HOSPITAL | Age: 75
End: 2025-01-03

## 2025-01-03 ENCOUNTER — RESULT REVIEW (OUTPATIENT)
Age: 75
End: 2025-01-03

## 2025-01-03 PROBLEM — R06.09 DYSPNEA ON EXERTION: Status: ACTIVE | Noted: 2020-07-01

## 2025-01-03 PROCEDURE — 74177 CT ABD & PELVIS W/CONTRAST: CPT

## 2025-01-03 PROCEDURE — 82565 ASSAY OF CREATININE: CPT

## 2025-01-03 PROCEDURE — 74177 CT ABD & PELVIS W/CONTRAST: CPT | Mod: 26

## 2025-01-06 ENCOUNTER — NON-APPOINTMENT (OUTPATIENT)
Age: 75
End: 2025-01-06

## 2025-01-10 ENCOUNTER — APPOINTMENT (OUTPATIENT)
Dept: PULMONOLOGY | Facility: CLINIC | Age: 75
End: 2025-01-10

## 2025-01-10 ENCOUNTER — NON-APPOINTMENT (OUTPATIENT)
Age: 75
End: 2025-01-10

## 2025-01-10 DIAGNOSIS — J84.9 INTERSTITIAL PULMONARY DISEASE, UNSPECIFIED: ICD-10-CM

## 2025-01-10 DIAGNOSIS — R06.09 OTHER FORMS OF DYSPNEA: ICD-10-CM

## 2025-01-10 DIAGNOSIS — M32.9 SYSTEMIC LUPUS ERYTHEMATOSUS, UNSPECIFIED: ICD-10-CM

## 2025-01-13 ENCOUNTER — APPOINTMENT (OUTPATIENT)
Dept: GASTROENTEROLOGY | Facility: CLINIC | Age: 75
End: 2025-01-13
Payer: MEDICARE

## 2025-01-13 PROCEDURE — 99213 OFFICE O/P EST LOW 20 MIN: CPT | Mod: 93

## 2025-02-04 ENCOUNTER — APPOINTMENT (OUTPATIENT)
Dept: HEPATOLOGY | Facility: CLINIC | Age: 75
End: 2025-02-04
Payer: MEDICARE

## 2025-02-04 ENCOUNTER — APPOINTMENT (OUTPATIENT)
Dept: HEPATOLOGY | Facility: CLINIC | Age: 75
End: 2025-02-04

## 2025-02-04 VITALS
HEART RATE: 83 BPM | SYSTOLIC BLOOD PRESSURE: 130 MMHG | TEMPERATURE: 97.4 F | HEIGHT: 64 IN | DIASTOLIC BLOOD PRESSURE: 82 MMHG | OXYGEN SATURATION: 96 %

## 2025-02-04 VITALS
SYSTOLIC BLOOD PRESSURE: 118 MMHG | OXYGEN SATURATION: 96 % | DIASTOLIC BLOOD PRESSURE: 83 MMHG | RESPIRATION RATE: 18 BRPM | HEART RATE: 83 BPM | TEMPERATURE: 97.5 F

## 2025-02-04 VITALS — WEIGHT: 145.51 LBS | BODY MASS INDEX: 24.98 KG/M2

## 2025-02-04 PROCEDURE — 99213 OFFICE O/P EST LOW 20 MIN: CPT

## 2025-02-04 PROCEDURE — 76981 USE PARENCHYMA: CPT

## 2025-02-12 ENCOUNTER — APPOINTMENT (OUTPATIENT)
Dept: PULMONOLOGY | Facility: CLINIC | Age: 75
End: 2025-02-12
Payer: MEDICARE

## 2025-02-12 VITALS
HEART RATE: 88 BPM | RESPIRATION RATE: 13 BRPM | WEIGHT: 146 LBS | SYSTOLIC BLOOD PRESSURE: 116 MMHG | OXYGEN SATURATION: 98 % | TEMPERATURE: 97 F | HEIGHT: 64 IN | BODY MASS INDEX: 24.92 KG/M2 | DIASTOLIC BLOOD PRESSURE: 75 MMHG

## 2025-02-12 DIAGNOSIS — R06.09 OTHER FORMS OF DYSPNEA: ICD-10-CM

## 2025-02-12 DIAGNOSIS — M32.9 SYSTEMIC LUPUS ERYTHEMATOSUS, UNSPECIFIED: ICD-10-CM

## 2025-02-12 DIAGNOSIS — J84.9 INTERSTITIAL PULMONARY DISEASE, UNSPECIFIED: ICD-10-CM

## 2025-02-12 PROCEDURE — G2211 COMPLEX E/M VISIT ADD ON: CPT

## 2025-02-12 PROCEDURE — 99214 OFFICE O/P EST MOD 30 MIN: CPT

## 2025-02-13 LAB — NT-PROBNP SERPL-MCNC: 351 PG/ML

## 2025-04-17 ENCOUNTER — APPOINTMENT (OUTPATIENT)
Dept: NEPHROLOGY | Facility: CLINIC | Age: 75
End: 2025-04-17
Payer: MEDICARE

## 2025-04-17 VITALS
BODY MASS INDEX: 24.92 KG/M2 | SYSTOLIC BLOOD PRESSURE: 114 MMHG | WEIGHT: 146 LBS | HEIGHT: 64 IN | DIASTOLIC BLOOD PRESSURE: 67 MMHG

## 2025-04-17 DIAGNOSIS — M32.9 SYSTEMIC LUPUS ERYTHEMATOSUS, UNSPECIFIED: ICD-10-CM

## 2025-04-17 DIAGNOSIS — N18.30 CHRONIC KIDNEY DISEASE, STAGE 3 UNSPECIFIED: ICD-10-CM

## 2025-04-17 DIAGNOSIS — R60.0 LOCALIZED EDEMA: ICD-10-CM

## 2025-04-17 DIAGNOSIS — E83.52 HYPERCALCEMIA: ICD-10-CM

## 2025-04-17 DIAGNOSIS — N25.81 SECONDARY HYPERPARATHYROIDISM OF RENAL ORIGIN: ICD-10-CM

## 2025-04-17 DIAGNOSIS — I10 ESSENTIAL (PRIMARY) HYPERTENSION: ICD-10-CM

## 2025-04-17 PROCEDURE — G2211 COMPLEX E/M VISIT ADD ON: CPT

## 2025-04-17 PROCEDURE — 99214 OFFICE O/P EST MOD 30 MIN: CPT

## 2025-04-21 RX ORDER — CINACALCET 30 MG/1
30 TABLET ORAL DAILY
Qty: 90 | Refills: 2 | Status: ACTIVE | COMMUNITY
Start: 2025-04-17 | End: 1900-01-01

## 2025-05-13 ENCOUNTER — APPOINTMENT (OUTPATIENT)
Dept: HEPATOLOGY | Facility: CLINIC | Age: 75
End: 2025-05-13
Payer: MEDICARE

## 2025-05-13 VITALS
DIASTOLIC BLOOD PRESSURE: 83 MMHG | HEART RATE: 89 BPM | SYSTOLIC BLOOD PRESSURE: 130 MMHG | TEMPERATURE: 97 F | OXYGEN SATURATION: 96 % | HEIGHT: 64 IN | WEIGHT: 145 LBS | BODY MASS INDEX: 24.75 KG/M2 | RESPIRATION RATE: 16 BRPM

## 2025-05-13 DIAGNOSIS — K76.0 FATTY (CHANGE OF) LIVER, NOT ELSEWHERE CLASSIFIED: ICD-10-CM

## 2025-05-13 PROCEDURE — 76981 USE PARENCHYMA: CPT

## 2025-05-13 PROCEDURE — 99214 OFFICE O/P EST MOD 30 MIN: CPT

## 2025-05-14 ENCOUNTER — APPOINTMENT (OUTPATIENT)
Dept: PULMONOLOGY | Facility: CLINIC | Age: 75
End: 2025-05-14
Payer: MEDICARE

## 2025-05-14 VITALS
TEMPERATURE: 97.9 F | HEART RATE: 76 BPM | RESPIRATION RATE: 15 BRPM | DIASTOLIC BLOOD PRESSURE: 60 MMHG | WEIGHT: 143 LBS | HEIGHT: 64 IN | SYSTOLIC BLOOD PRESSURE: 122 MMHG | OXYGEN SATURATION: 97 % | BODY MASS INDEX: 24.41 KG/M2

## 2025-05-14 DIAGNOSIS — R06.09 OTHER FORMS OF DYSPNEA: ICD-10-CM

## 2025-05-14 DIAGNOSIS — M32.9 SYSTEMIC LUPUS ERYTHEMATOSUS, UNSPECIFIED: ICD-10-CM

## 2025-05-14 DIAGNOSIS — J84.9 INTERSTITIAL PULMONARY DISEASE, UNSPECIFIED: ICD-10-CM

## 2025-05-14 PROCEDURE — 94727 GAS DIL/WSHOT DETER LNG VOL: CPT

## 2025-05-14 PROCEDURE — ZZZZZ: CPT

## 2025-05-14 PROCEDURE — 94010 BREATHING CAPACITY TEST: CPT

## 2025-05-14 PROCEDURE — 99214 OFFICE O/P EST MOD 30 MIN: CPT | Mod: 25

## 2025-05-14 PROCEDURE — 94618 PULMONARY STRESS TESTING: CPT

## 2025-05-14 PROCEDURE — 94729 DIFFUSING CAPACITY: CPT

## 2025-05-15 LAB — NT-PROBNP SERPL-MCNC: 457 PG/ML

## 2025-05-16 LAB
1. LITTLE INTEREST OR PLEASURE IN DOING THINGS: 0
2. FEELING DOWN, DEPRESSED, OR HOPELESS: 0
ADULT DEPRESS SCRN ASSESS: NEGATIVE
PHQ-2 ADULT DEPRESSION SCORE: 0

## 2025-05-21 ENCOUNTER — NON-APPOINTMENT (OUTPATIENT)
Age: 75
End: 2025-05-21

## 2025-06-11 ENCOUNTER — APPOINTMENT (OUTPATIENT)
Dept: PULMONOLOGY | Facility: CLINIC | Age: 75
End: 2025-06-11
Payer: MEDICARE

## 2025-06-11 VITALS
DIASTOLIC BLOOD PRESSURE: 80 MMHG | OXYGEN SATURATION: 98 % | TEMPERATURE: 97 F | WEIGHT: 143 LBS | HEIGHT: 64 IN | HEART RATE: 72 BPM | BODY MASS INDEX: 24.41 KG/M2 | SYSTOLIC BLOOD PRESSURE: 104 MMHG

## 2025-06-11 PROCEDURE — 94729 DIFFUSING CAPACITY: CPT

## 2025-06-11 PROCEDURE — 99213 OFFICE O/P EST LOW 20 MIN: CPT | Mod: 25

## 2025-06-11 PROCEDURE — 94727 GAS DIL/WSHOT DETER LNG VOL: CPT

## 2025-06-11 PROCEDURE — 94010 BREATHING CAPACITY TEST: CPT

## 2025-07-15 ENCOUNTER — APPOINTMENT (OUTPATIENT)
Dept: PULMONOLOGY | Facility: CLINIC | Age: 75
End: 2025-07-15
Payer: MEDICARE

## 2025-07-15 PROCEDURE — 99214 OFFICE O/P EST MOD 30 MIN: CPT | Mod: 93

## 2025-07-15 PROCEDURE — G2211 COMPLEX E/M VISIT ADD ON: CPT | Mod: 93

## 2025-09-02 ENCOUNTER — APPOINTMENT (OUTPATIENT)
Dept: GASTROENTEROLOGY | Facility: CLINIC | Age: 75
End: 2025-09-02

## 2025-09-02 VITALS
WEIGHT: 140 LBS | HEIGHT: 64 IN | SYSTOLIC BLOOD PRESSURE: 110 MMHG | TEMPERATURE: 97 F | DIASTOLIC BLOOD PRESSURE: 74 MMHG | BODY MASS INDEX: 23.9 KG/M2

## 2025-09-02 PROCEDURE — 99215 OFFICE O/P EST HI 40 MIN: CPT

## 2025-09-02 PROCEDURE — G2211 COMPLEX E/M VISIT ADD ON: CPT

## 2025-09-02 RX ORDER — SODIUM SULFATE, MAGNESIUM SULFATE, AND POTASSIUM CHLORIDE 17.75; 2.7; 2.25 G/1; G/1; G/1
1479-225-188 TABLET ORAL
Qty: 24 | Refills: 0 | Status: DISCONTINUED | COMMUNITY
Start: 2025-09-02 | End: 2025-09-02